# Patient Record
Sex: MALE | Race: WHITE | HISPANIC OR LATINO | Employment: PART TIME | URBAN - METROPOLITAN AREA
[De-identification: names, ages, dates, MRNs, and addresses within clinical notes are randomized per-mention and may not be internally consistent; named-entity substitution may affect disease eponyms.]

---

## 2022-12-18 ENCOUNTER — HOSPITAL ENCOUNTER (EMERGENCY)
Facility: HOSPITAL | Age: 32
Discharge: HOME | End: 2022-12-18
Attending: EMERGENCY MEDICINE
Payer: COMMERCIAL

## 2022-12-18 VITALS
TEMPERATURE: 99 F | WEIGHT: 188.19 LBS | OXYGEN SATURATION: 98 % | RESPIRATION RATE: 18 BRPM | HEART RATE: 84 BPM | DIASTOLIC BLOOD PRESSURE: 77 MMHG | SYSTOLIC BLOOD PRESSURE: 147 MMHG | BODY MASS INDEX: 30.24 KG/M2 | HEIGHT: 66 IN

## 2022-12-18 DIAGNOSIS — J32.9 SINUSITIS, UNSPECIFIED CHRONICITY, UNSPECIFIED LOCATION: Primary | ICD-10-CM

## 2022-12-18 LAB
INFLUENZA A, MOLECULAR: NEGATIVE
INFLUENZA B, MOLECULAR: NEGATIVE
SARS-COV-2 RDRP RESP QL NAA+PROBE: NEGATIVE
SPECIMEN SOURCE: NORMAL

## 2022-12-18 PROCEDURE — U0002 COVID-19 LAB TEST NON-CDC: HCPCS | Performed by: EMERGENCY MEDICINE

## 2022-12-18 PROCEDURE — 96372 THER/PROPH/DIAG INJ SC/IM: CPT | Performed by: NURSE PRACTITIONER

## 2022-12-18 PROCEDURE — 63600175 PHARM REV CODE 636 W HCPCS: Performed by: NURSE PRACTITIONER

## 2022-12-18 PROCEDURE — 99284 EMERGENCY DEPT VISIT MOD MDM: CPT

## 2022-12-18 PROCEDURE — 87502 INFLUENZA DNA AMP PROBE: CPT | Performed by: EMERGENCY MEDICINE

## 2022-12-18 RX ORDER — CETIRIZINE HYDROCHLORIDE 10 MG/1
10 TABLET ORAL DAILY
Qty: 30 TABLET | Refills: 0 | Status: SHIPPED | OUTPATIENT
Start: 2022-12-18 | End: 2023-12-18

## 2022-12-18 RX ORDER — DEXAMETHASONE SODIUM PHOSPHATE 4 MG/ML
8 INJECTION, SOLUTION INTRA-ARTICULAR; INTRALESIONAL; INTRAMUSCULAR; INTRAVENOUS; SOFT TISSUE
Status: COMPLETED | OUTPATIENT
Start: 2022-12-18 | End: 2022-12-18

## 2022-12-18 RX ADMIN — DEXAMETHASONE SODIUM PHOSPHATE 8 MG: 4 INJECTION INTRA-ARTICULAR; INTRALESIONAL; INTRAMUSCULAR; INTRAVENOUS; SOFT TISSUE at 01:12

## 2022-12-18 NOTE — ED PROVIDER NOTES
Encounter Date: 12/18/2022       History     Chief Complaint   Patient presents with    Headache     Congestion, headache, ear pain, pain to face, eyes, denies known sick contacts     Patient complains of facial pressure sinus congestion with nasal discharge that is bloody and purulent at times also reports dizziness and pain around his ears.  States he has this problem waxing and waning for 2 months.  But this episode has been going on for 3 days.  Did not take any medication for this problem.  Denies any exacerbating or mitigating factors.      Review of patient's allergies indicates:  No Known Allergies  No past medical history on file.  No past surgical history on file.  No family history on file.     Review of Systems   Constitutional:  Negative for fever.   HENT:  Negative for sore throat.    Respiratory:  Negative for shortness of breath.    Cardiovascular:  Negative for chest pain.   Gastrointestinal:  Negative for nausea.   Genitourinary:  Negative for dysuria.   Musculoskeletal:  Negative for back pain.   Skin:  Negative for rash.   Neurological:  Negative for weakness.   Hematological:  Does not bruise/bleed easily.     Physical Exam     Initial Vitals [12/18/22 1142]   BP Pulse Resp Temp SpO2   (!) 147/77 84 18 98.8 °F (37.1 °C) 98 %      MAP       --         Physical Exam    Nursing note and vitals reviewed.  Constitutional: He appears well-developed and well-nourished.   HENT:   Head: Normocephalic and atraumatic.   Mild bilateral maxillary and frontal sinus TTP.  Serous effusion in bilateral ears, no bulging.  Posterior pharynx is erythematous, no exudates tonsils +2 bilateral   Eyes: Conjunctivae are normal. Pupils are equal, round, and reactive to light.   Neck: Neck supple.   Normal range of motion.  Cardiovascular:  Normal rate, regular rhythm, normal heart sounds and intact distal pulses.           Pulmonary/Chest: Breath sounds normal.   Abdominal: Abdomen is soft. There is no rebound and no  guarding.   Musculoskeletal:         General: Normal range of motion.      Cervical back: Normal range of motion and neck supple.     Neurological: He is alert.   Skin: Skin is warm and dry.   Psychiatric: He has a normal mood and affect. His behavior is normal. Thought content normal.       ED Course   Procedures  Labs Reviewed   INFLUENZA A & B BY MOLECULAR   SARS-COV-2 RNA AMPLIFICATION, QUAL          Imaging Results    None          Medications   dexAMETHasone injection 8 mg (has no administration in time range)                     I discussed with patient and/or family/caretaker that evaluation in the ED does not suggest any emergent or life threatening medical conditions requiring immediate intervention beyond what was provided in the ED, and I believe patient is safe for discharge.  Regardless, an unremarkable evaluation in the ED does not preclude the development or presence of a serious of life threatening condition. As such, patient was instructed to return immediately for any worsening or change in current symptoms.           Clinical Impression:   Final diagnoses:  [J32.9] Sinusitis, unspecified chronicity, unspecified location (Primary)        ED Disposition Condition    Discharge Stable          ED Prescriptions       Medication Sig Dispense Start Date End Date Auth. Provider    cetirizine (ZYRTEC) 10 MG tablet Take 1 tablet (10 mg total) by mouth once daily. 30 tablet 12/18/2022 12/18/2023 Mac Gore NP          Follow-up Information       Follow up With Specialties Details Why Contact Info    pcp  Schedule an appointment as soon as possible for a visit  As needed              Mac Gore NP  12/18/22 4382

## 2023-02-17 ENCOUNTER — HOSPITAL ENCOUNTER (EMERGENCY)
Facility: HOSPITAL | Age: 33
Discharge: HOME | End: 2023-02-18
Attending: EMERGENCY MEDICINE
Payer: COMMERCIAL

## 2023-02-17 DIAGNOSIS — K62.89 ACUTE PROCTITIS: Primary | ICD-10-CM

## 2023-02-17 PROCEDURE — 99285 EMERGENCY DEPT VISIT HI MDM: CPT | Mod: 25

## 2023-02-18 VITALS
SYSTOLIC BLOOD PRESSURE: 122 MMHG | TEMPERATURE: 99 F | OXYGEN SATURATION: 98 % | HEART RATE: 77 BPM | DIASTOLIC BLOOD PRESSURE: 75 MMHG | HEIGHT: 66 IN | BODY MASS INDEX: 30.37 KG/M2 | RESPIRATION RATE: 20 BRPM

## 2023-02-18 LAB
ALBUMIN SERPL BCP-MCNC: 4 G/DL (ref 3.5–5.2)
ALP SERPL-CCNC: 86 U/L (ref 55–135)
ALT SERPL W/O P-5'-P-CCNC: 50 U/L (ref 10–44)
ANION GAP SERPL CALC-SCNC: 14 MMOL/L (ref 8–16)
AST SERPL-CCNC: 33 U/L (ref 10–40)
BASOPHILS # BLD AUTO: 0.08 K/UL (ref 0–0.2)
BASOPHILS NFR BLD: 0.9 % (ref 0–1.9)
BILIRUB SERPL-MCNC: 0.3 MG/DL (ref 0.1–1)
BILIRUB UR QL STRIP: NEGATIVE
BUN SERPL-MCNC: 11 MG/DL (ref 6–20)
CALCIUM SERPL-MCNC: 9.6 MG/DL (ref 8.7–10.5)
CHLORIDE SERPL-SCNC: 105 MMOL/L (ref 95–110)
CLARITY UR: CLEAR
CO2 SERPL-SCNC: 21 MMOL/L (ref 23–29)
COLOR UR: COLORLESS
CREAT SERPL-MCNC: 0.9 MG/DL (ref 0.5–1.4)
DIFFERENTIAL METHOD: ABNORMAL
EOSINOPHIL # BLD AUTO: 0.2 K/UL (ref 0–0.5)
EOSINOPHIL NFR BLD: 1.8 % (ref 0–8)
ERYTHROCYTE [DISTWIDTH] IN BLOOD BY AUTOMATED COUNT: 12.7 % (ref 11.5–14.5)
EST. GFR  (NO RACE VARIABLE): >60 ML/MIN/1.73 M^2
GLUCOSE SERPL-MCNC: 99 MG/DL (ref 70–110)
GLUCOSE UR QL STRIP: NEGATIVE
HCT VFR BLD AUTO: 39.5 % (ref 40–54)
HCV AB SERPL QL IA: NEGATIVE
HGB BLD-MCNC: 13.5 G/DL (ref 14–18)
HGB UR QL STRIP: NEGATIVE
IMM GRANULOCYTES # BLD AUTO: 0.03 K/UL (ref 0–0.04)
IMM GRANULOCYTES NFR BLD AUTO: 0.3 % (ref 0–0.5)
KETONES UR QL STRIP: NEGATIVE
LEUKOCYTE ESTERASE UR QL STRIP: NEGATIVE
LYMPHOCYTES # BLD AUTO: 2.3 K/UL (ref 1–4.8)
LYMPHOCYTES NFR BLD: 25.6 % (ref 18–48)
MCH RBC QN AUTO: 30.5 PG (ref 27–31)
MCHC RBC AUTO-ENTMCNC: 34.2 G/DL (ref 32–36)
MCV RBC AUTO: 89 FL (ref 82–98)
MONOCYTES # BLD AUTO: 0.8 K/UL (ref 0.3–1)
MONOCYTES NFR BLD: 9 % (ref 4–15)
NEUTROPHILS # BLD AUTO: 5.7 K/UL (ref 1.8–7.7)
NEUTROPHILS NFR BLD: 62.4 % (ref 38–73)
NITRITE UR QL STRIP: NEGATIVE
NRBC BLD-RTO: 0 /100 WBC
PH UR STRIP: 6 [PH] (ref 5–8)
PLATELET # BLD AUTO: 319 K/UL (ref 150–450)
PMV BLD AUTO: 9.5 FL (ref 9.2–12.9)
POTASSIUM SERPL-SCNC: 3.3 MMOL/L (ref 3.5–5.1)
PROT SERPL-MCNC: 8.2 G/DL (ref 6–8.4)
PROT UR QL STRIP: NEGATIVE
RBC # BLD AUTO: 4.43 M/UL (ref 4.6–6.2)
SODIUM SERPL-SCNC: 140 MMOL/L (ref 136–145)
SP GR UR STRIP: 1 (ref 1–1.03)
URN SPEC COLLECT METH UR: ABNORMAL
UROBILINOGEN UR STRIP-ACNC: NEGATIVE EU/DL
WBC # BLD AUTO: 9.11 K/UL (ref 3.9–12.7)

## 2023-02-18 PROCEDURE — 63600175 PHARM REV CODE 636 W HCPCS: Performed by: EMERGENCY MEDICINE

## 2023-02-18 PROCEDURE — 85025 COMPLETE CBC W/AUTO DIFF WBC: CPT | Performed by: EMERGENCY MEDICINE

## 2023-02-18 PROCEDURE — 25000003 PHARM REV CODE 250: Performed by: EMERGENCY MEDICINE

## 2023-02-18 PROCEDURE — 86803 HEPATITIS C AB TEST: CPT | Performed by: EMERGENCY MEDICINE

## 2023-02-18 PROCEDURE — 80053 COMPREHEN METABOLIC PANEL: CPT | Performed by: EMERGENCY MEDICINE

## 2023-02-18 PROCEDURE — 81003 URINALYSIS AUTO W/O SCOPE: CPT | Performed by: EMERGENCY MEDICINE

## 2023-02-18 PROCEDURE — 96365 THER/PROPH/DIAG IV INF INIT: CPT

## 2023-02-18 PROCEDURE — 25500020 PHARM REV CODE 255: Performed by: EMERGENCY MEDICINE

## 2023-02-18 RX ORDER — DOXYCYCLINE 100 MG/1
100 CAPSULE ORAL 2 TIMES DAILY
Qty: 42 CAPSULE | Refills: 0 | Status: SHIPPED | OUTPATIENT
Start: 2023-02-18 | End: 2023-03-11

## 2023-02-18 RX ADMIN — CEFTRIAXONE 1 G: 1 INJECTION, POWDER, FOR SOLUTION INTRAMUSCULAR; INTRAVENOUS at 04:02

## 2023-02-18 RX ADMIN — IOHEXOL 100 ML: 350 INJECTION, SOLUTION INTRAVENOUS at 02:02

## 2023-02-18 NOTE — ED PROVIDER NOTES
SCRIBE #1 NOTE: I, Justin Rice, am scribing for, and in the presence of, Nikole Curry MD. I have scribed the entire note.       History     Chief Complaint   Patient presents with    Rectal Bleeding     X 2 weeks after having sexual relations, decreased appetite, headache, rectal pain     Review of patient's allergies indicates:  No Known Allergies      History of Present Illness     HPI    2/18/2023, 12:20 AM  History obtained from the patient      History of Present Illness: Ramon Marcial is a 32 y.o. male patient who presents to the Emergency Department for evaluation of rectal bleeding which onset gradually 2 weeks ago. Pt states he had sexual intercourse when he had rectal pain and bleeding. He notes he has fecal urgency but only excretes odorous blood clots and white discharge. He also c/o dysuria and urinary urgency.  Symptoms are constant and moderate in severity. No mitigating or exacerbating factors reported. Associated sxs include abdominal pain and fever. Patient denies any HA, CP, SOB, hematuria, flank pain, and all other sxs at this time. No further complaints or concerns at this time.       Arrival mode: Personal vehicle    PCP: Primary Doctor No        Past Medical History:  History reviewed. No pertinent past medical history.    Past Surgical History:  History reviewed. No pertinent surgical history.      Family History:  History reviewed. No pertinent family history.    Social History:  Social History     Tobacco Use    Smoking status: Unknown    Smokeless tobacco: Not on file   Substance and Sexual Activity    Alcohol use: Not on file    Drug use: Not on file    Sexual activity: Not on file        Review of Systems     Review of Systems   Constitutional:  Positive for fever (subjective).   HENT:  Negative for sore throat.    Respiratory:  Negative for shortness of breath.    Cardiovascular:  Negative for chest pain.   Gastrointestinal:  Positive for abdominal pain and anal  bleeding. Negative for constipation, diarrhea, nausea and vomiting.   Genitourinary:  Positive for dysuria and urgency. Negative for flank pain.   Musculoskeletal:  Negative for back pain.   Skin:  Negative for rash.   Neurological:  Negative for dizziness, weakness, numbness and headaches.   Hematological:  Does not bruise/bleed easily.   All other systems reviewed and are negative.     Physical Exam     Initial Vitals [02/17/23 2328]   BP Pulse Resp Temp SpO2   (!) 144/86 97 18 99.9 °F (37.7 °C) 99 %      MAP       --          Physical Exam   Nursing Notes and Vital Signs Reviewed.  Constitutional: Patient is in no acute distress. Well-developed and well-nourished.  Head: Atraumatic. Normocephalic.  Eyes: PERRL. EOM intact. Conjunctivae are not pale. No scleral icterus.  ENT: Mucous membranes are moist. Oropharynx is clear and symmetric.    Neck: Supple. Full ROM. No lymphadenopathy.  Cardiovascular: Regular rate. Regular rhythm. No murmurs, rubs, or gallops. Distal pulses are 2+ and symmetric.  Pulmonary/Chest: No respiratory distress. Clear to auscultation bilaterally. No wheezing or rales.  Abdominal: Soft and non-distended.  There is no tenderness.  No rebound, guarding, or rigidity. Good bowel sounds.  Genitourinary: No CVA tenderness  Rectal: Male chaperone present for the duration of the rectal exam.There is no tenderness. There is blood in the rectal vault. Normal sphincter tone. No mass or ulceration. No external lesions   Musculoskeletal: Moves all extremities. No obvious deformities. No edema. No calf tenderness.  Skin: Warm and dry.  Neurological:  Alert, awake, and appropriate.  Normal speech.  No acute focal neurological deficits are appreciated.  Psychiatric: Normal affect. Good eye contact. Appropriate in content.     ED Course   Procedures  ED Vital Signs:  Vitals:    02/17/23 2328 02/18/23 0235 02/18/23 0237 02/18/23 0500   BP: (!) 144/86 138/79  122/75   Pulse: 97 76  77   Resp: 18 20     Temp:  "99.9 °F (37.7 °C)  98.6 °F (37 °C) 98.7 °F (37.1 °C)   TempSrc: Oral  Oral    SpO2: 99% 98%  98%   Height: 5' 6" (1.676 m)          Abnormal Lab Results:  Labs Reviewed   CBC W/ AUTO DIFFERENTIAL - Abnormal; Notable for the following components:       Result Value    RBC 4.43 (*)     Hemoglobin 13.5 (*)     Hematocrit 39.5 (*)     All other components within normal limits   COMPREHENSIVE METABOLIC PANEL - Abnormal; Notable for the following components:    Potassium 3.3 (*)     CO2 21 (*)     ALT 50 (*)     All other components within normal limits   URINALYSIS, REFLEX TO URINE CULTURE - Abnormal; Notable for the following components:    Color, UA Colorless (*)     All other components within normal limits    Narrative:     Specimen Source->Urine   HEPATITIS C ANTIBODY   HEP C VIRUS HOLD SPECIMEN        All Lab Results:  Results for orders placed or performed during the hospital encounter of 02/17/23   CBC auto differential   Result Value Ref Range    WBC 9.11 3.90 - 12.70 K/uL    RBC 4.43 (L) 4.60 - 6.20 M/uL    Hemoglobin 13.5 (L) 14.0 - 18.0 g/dL    Hematocrit 39.5 (L) 40.0 - 54.0 %    MCV 89 82 - 98 fL    MCH 30.5 27.0 - 31.0 pg    MCHC 34.2 32.0 - 36.0 g/dL    RDW 12.7 11.5 - 14.5 %    Platelets 319 150 - 450 K/uL    MPV 9.5 9.2 - 12.9 fL    Immature Granulocytes 0.3 0.0 - 0.5 %    Gran # (ANC) 5.7 1.8 - 7.7 K/uL    Immature Grans (Abs) 0.03 0.00 - 0.04 K/uL    Lymph # 2.3 1.0 - 4.8 K/uL    Mono # 0.8 0.3 - 1.0 K/uL    Eos # 0.2 0.0 - 0.5 K/uL    Baso # 0.08 0.00 - 0.20 K/uL    nRBC 0 0 /100 WBC    Gran % 62.4 38.0 - 73.0 %    Lymph % 25.6 18.0 - 48.0 %    Mono % 9.0 4.0 - 15.0 %    Eosinophil % 1.8 0.0 - 8.0 %    Basophil % 0.9 0.0 - 1.9 %    Differential Method Automated    Comprehensive metabolic panel   Result Value Ref Range    Sodium 140 136 - 145 mmol/L    Potassium 3.3 (L) 3.5 - 5.1 mmol/L    Chloride 105 95 - 110 mmol/L    CO2 21 (L) 23 - 29 mmol/L    Glucose 99 70 - 110 mg/dL    BUN 11 6 - 20 mg/dL    " Creatinine 0.9 0.5 - 1.4 mg/dL    Calcium 9.6 8.7 - 10.5 mg/dL    Total Protein 8.2 6.0 - 8.4 g/dL    Albumin 4.0 3.5 - 5.2 g/dL    Total Bilirubin 0.3 0.1 - 1.0 mg/dL    Alkaline Phosphatase 86 55 - 135 U/L    AST 33 10 - 40 U/L    ALT 50 (H) 10 - 44 U/L    Anion Gap 14 8 - 16 mmol/L    eGFR >60 >60 mL/min/1.73 m^2   Urinalysis, Reflex to Urine Culture Urine, Clean Catch    Specimen: Urine   Result Value Ref Range    Specimen UA Urine, Clean Catch     Color, UA Colorless (A) Yellow, Straw, Saadia    Appearance, UA Clear Clear    pH, UA 6.0 5.0 - 8.0    Specific Gravity, UA 1.005 1.005 - 1.030    Protein, UA Negative Negative    Glucose, UA Negative Negative    Ketones, UA Negative Negative    Bilirubin (UA) Negative Negative    Occult Blood UA Negative Negative    Nitrite, UA Negative Negative    Urobilinogen, UA Negative <2.0 EU/dL    Leukocytes, UA Negative Negative          Imaging Results:  Imaging Results              CT Abdomen Pelvis With Contrast (In process)                    3:55 AM: Per Emmanuel Mcgregor MD from [facility], pt's CT abdomen and pelvis w/ IV Contrast results: Diffuse rectal wall thickening with adjacent stranding. A sister with this are multiple prominent perirectal lymph node. Findings likely related to proctitis. Neoplasm is not excluded. No evidence of bowel obstruction. No evidence of pneumatosis, portal venous gas, or free air. Colonic diverticulosis. No evidence of diverticulitis. Normal appendix. No other acute findings.       The Emergency Provider reviewed the vital signs and test results, which are outlined above.     ED Discussion       4:00 AM: Reassessed pt at this time. Discussed with pt all pertinent ED information and results. Discussed pt dx and plan of tx. Gave pt all f/u and return to the ED instructions. All questions and concerns were addressed at this time. Pt expresses understanding of information and instructions, and is comfortable with plan to discharge. Pt is  stable for discharge.    I discussed with patient and/or family/caretaker that evaluation in the ED does not suggest any emergent or life threatening medical conditions requiring immediate intervention beyond what was provided in the ED, and I believe patient is safe for discharge.  Regardless, an unremarkable evaluation in the ED does not preclude the development or presence of a serious of life threatening condition. As such, patient was instructed to return immediately for any worsening or change in current symptoms.         Medical Decision Making:   Clinical Tests:   Lab Tests: Ordered and Reviewed         ED Medication(s):  Medications   iohexoL (OMNIPAQUE 350) injection 100 mL (100 mLs Intravenous Given 2/18/23 0221)   cefTRIAXone (ROCEPHIN) 1 g in dextrose 5 % in water (D5W) 5 % 50 mL IVPB (MB+) (0 g Intravenous Stopped 2/18/23 0459)       New Prescriptions    DOXYCYCLINE (VIBRAMYCIN) 100 MG CAP    Take 1 capsule (100 mg total) by mouth 2 (two) times daily. for 21 days        Follow-up Information       Emmanuel Baker MD In 2 days.    Specialties: Colon and Rectal Surgery, General Surgery  Contact information:  [address] [place] [address] DR Madiha MENDOZA [id number] [phone]                                 Scribe Attestation:   Scribe #1: I performed the above scribed service and the documentation accurately describes the services I performed. I attest to the accuracy of the note.     Attending:   Physician Attestation Statement for Scribe #1: I, Nikole Curry MD, personally performed the services described in this documentation, as scribed by Justin Rice, in my presence, and it is both accurate and complete.           Clinical Impression       ICD-10-CM ICD-9-CM   1. Acute proctitis  K62.89 569.49       Disposition:   Disposition: Discharged  Condition: Stable       Nikole Curry MD  02/18/23 1499

## 2023-02-20 ENCOUNTER — OFFICE VISIT (OUTPATIENT)
Dept: SURGERY | Facility: CLINIC | Age: 33
End: 2023-02-20
Payer: COMMERCIAL

## 2023-02-20 ENCOUNTER — OFFICE VISIT (OUTPATIENT)
Dept: INFECTIOUS DISEASES | Facility: CLINIC | Age: 33
End: 2023-02-20
Payer: COMMERCIAL

## 2023-02-20 VITALS
DIASTOLIC BLOOD PRESSURE: 81 MMHG | HEART RATE: 75 BPM | BODY MASS INDEX: 30.35 KG/M2 | WEIGHT: 188.06 LBS | SYSTOLIC BLOOD PRESSURE: 128 MMHG

## 2023-02-20 VITALS
HEIGHT: 66 IN | BODY MASS INDEX: 30.22 KG/M2 | HEART RATE: 75 BPM | SYSTOLIC BLOOD PRESSURE: 128 MMHG | DIASTOLIC BLOOD PRESSURE: 81 MMHG | WEIGHT: 188.06 LBS

## 2023-02-20 DIAGNOSIS — R30.0 DYSURIA: ICD-10-CM

## 2023-02-20 DIAGNOSIS — K62.89 PROCTITIS: ICD-10-CM

## 2023-02-20 DIAGNOSIS — R15.2 FECAL URGENCY: ICD-10-CM

## 2023-02-20 DIAGNOSIS — A64 SEXUALLY TRANSMITTED DISEASE: ICD-10-CM

## 2023-02-20 DIAGNOSIS — A56.3: ICD-10-CM

## 2023-02-20 DIAGNOSIS — B20 HIV INFECTION, UNSPECIFIED SYMPTOM STATUS: ICD-10-CM

## 2023-02-20 DIAGNOSIS — A64 SEXUALLY TRANSMITTED DISEASE: Primary | ICD-10-CM

## 2023-02-20 DIAGNOSIS — K62.5 RECTAL BLEEDING: Primary | ICD-10-CM

## 2023-02-20 DIAGNOSIS — K92.1 HEMATOCHEZIA: ICD-10-CM

## 2023-02-20 PROBLEM — Z21 HIV (HUMAN IMMUNODEFICIENCY VIRUS INFECTION): Status: ACTIVE | Noted: 2021-09-25

## 2023-02-20 PROCEDURE — 1159F PR MEDICATION LIST DOCUMENTED IN MEDICAL RECORD: ICD-10-PCS | Mod: CPTII,S$GLB,, | Performed by: INTERNAL MEDICINE

## 2023-02-20 PROCEDURE — 3074F PR MOST RECENT SYSTOLIC BLOOD PRESSURE < 130 MM HG: ICD-10-PCS | Mod: CPTII,S$GLB,, | Performed by: COLON & RECTAL SURGERY

## 2023-02-20 PROCEDURE — 99204 OFFICE O/P NEW MOD 45 MIN: CPT | Mod: S$GLB,,, | Performed by: COLON & RECTAL SURGERY

## 2023-02-20 PROCEDURE — 3074F PR MOST RECENT SYSTOLIC BLOOD PRESSURE < 130 MM HG: ICD-10-PCS | Mod: CPTII,S$GLB,, | Performed by: INTERNAL MEDICINE

## 2023-02-20 PROCEDURE — 99999 PR PBB SHADOW E&M-EST. PATIENT-LVL III: ICD-10-PCS | Mod: PBBFAC,,, | Performed by: INTERNAL MEDICINE

## 2023-02-20 PROCEDURE — 99204 PR OFFICE/OUTPT VISIT, NEW, LEVL IV, 45-59 MIN: ICD-10-PCS | Mod: S$GLB,,, | Performed by: COLON & RECTAL SURGERY

## 2023-02-20 PROCEDURE — 3008F BODY MASS INDEX DOCD: CPT | Mod: CPTII,S$GLB,, | Performed by: INTERNAL MEDICINE

## 2023-02-20 PROCEDURE — 3079F PR MOST RECENT DIASTOLIC BLOOD PRESSURE 80-89 MM HG: ICD-10-PCS | Mod: CPTII,S$GLB,, | Performed by: INTERNAL MEDICINE

## 2023-02-20 PROCEDURE — 3008F PR BODY MASS INDEX (BMI) DOCUMENTED: ICD-10-PCS | Mod: CPTII,S$GLB,, | Performed by: INTERNAL MEDICINE

## 2023-02-20 PROCEDURE — 3079F PR MOST RECENT DIASTOLIC BLOOD PRESSURE 80-89 MM HG: ICD-10-PCS | Mod: CPTII,S$GLB,, | Performed by: COLON & RECTAL SURGERY

## 2023-02-20 PROCEDURE — 3074F SYST BP LT 130 MM HG: CPT | Mod: CPTII,S$GLB,, | Performed by: COLON & RECTAL SURGERY

## 2023-02-20 PROCEDURE — 99999 PR PBB SHADOW E&M-EST. PATIENT-LVL III: CPT | Mod: PBBFAC,,, | Performed by: INTERNAL MEDICINE

## 2023-02-20 PROCEDURE — 3079F DIAST BP 80-89 MM HG: CPT | Mod: CPTII,S$GLB,, | Performed by: COLON & RECTAL SURGERY

## 2023-02-20 PROCEDURE — 99999 PR PBB SHADOW E&M-EST. PATIENT-LVL III: ICD-10-PCS | Mod: PBBFAC,,, | Performed by: COLON & RECTAL SURGERY

## 2023-02-20 PROCEDURE — 3008F BODY MASS INDEX DOCD: CPT | Mod: CPTII,S$GLB,, | Performed by: COLON & RECTAL SURGERY

## 2023-02-20 PROCEDURE — 3079F DIAST BP 80-89 MM HG: CPT | Mod: CPTII,S$GLB,, | Performed by: INTERNAL MEDICINE

## 2023-02-20 PROCEDURE — 1159F MED LIST DOCD IN RCRD: CPT | Mod: CPTII,S$GLB,, | Performed by: INTERNAL MEDICINE

## 2023-02-20 PROCEDURE — 3074F SYST BP LT 130 MM HG: CPT | Mod: CPTII,S$GLB,, | Performed by: INTERNAL MEDICINE

## 2023-02-20 PROCEDURE — 3008F PR BODY MASS INDEX (BMI) DOCUMENTED: ICD-10-PCS | Mod: CPTII,S$GLB,, | Performed by: COLON & RECTAL SURGERY

## 2023-02-20 PROCEDURE — 99999 PR PBB SHADOW E&M-EST. PATIENT-LVL III: CPT | Mod: PBBFAC,,, | Performed by: COLON & RECTAL SURGERY

## 2023-02-20 PROCEDURE — 99204 PR OFFICE/OUTPT VISIT, NEW, LEVL IV, 45-59 MIN: ICD-10-PCS | Mod: S$GLB,,, | Performed by: INTERNAL MEDICINE

## 2023-02-20 PROCEDURE — 87491 CHLMYD TRACH DNA AMP PROBE: CPT | Performed by: INTERNAL MEDICINE

## 2023-02-20 PROCEDURE — 99204 OFFICE O/P NEW MOD 45 MIN: CPT | Mod: S$GLB,,, | Performed by: INTERNAL MEDICINE

## 2023-02-20 RX ORDER — TRAZODONE HYDROCHLORIDE 50 MG/1
50 TABLET ORAL NIGHTLY
COMMUNITY
Start: 2023-01-30

## 2023-02-20 RX ORDER — BICTEGRAVIR SODIUM, EMTRICITABINE, AND TENOFOVIR ALAFENAMIDE FUMARATE 50; 200; 25 MG/1; MG/1; MG/1
1 TABLET ORAL
COMMUNITY
Start: 2023-01-30

## 2023-02-20 RX ORDER — ONDANSETRON 4 MG/1
4 TABLET, FILM COATED ORAL EVERY 12 HOURS PRN
Qty: 40 TABLET | Refills: 0 | Status: SHIPPED | OUTPATIENT
Start: 2023-02-20 | End: 2023-03-12

## 2023-02-20 RX ORDER — ESCITALOPRAM OXALATE 10 MG/1
10 TABLET ORAL
COMMUNITY
Start: 2023-01-30

## 2023-02-20 RX ORDER — ERGOCALCIFEROL 1.25 MG/1
50000 CAPSULE ORAL
COMMUNITY
Start: 2023-01-30

## 2023-02-20 NOTE — PROGRESS NOTES
Subjective:       Patient ID: Ramon Marcial is a 32 y.o. male.    Chief Complaint: STD CHECK     HPI 32 year old man with HIV -on Biktarvy   HIV was acquired 2 years ago -  Risk factor -MSM  He was referred by Dr Baker for rectal bleeding post male insertive intercourse   He was seen in the ED- 02/18 for this and was started on doxycycline .  He reports nausea with this medication.  CT scan of the abdomen -   Rectal wall thickening with inflammatory changes in the perirectal fat.  Findings could be related to proctitis or diverticulitis versus neoplastic process.  Colonic diverticulosis      PMH-      past surgical history -ORIF of right ankle and left ankle    family history -.DM,  Social History     Socioeconomic History    Marital status: Single   Tobacco Use    Smoking status: Some Days     Types: Cigarettes     Review of Systems   Constitutional:  Positive for activity change. Negative for appetite change, chills, diaphoresis and fatigue.   HENT:  Negative for congestion, dental problem, ear discharge, ear pain and facial swelling.    Eyes:  Negative for pain, discharge and itching.   Respiratory:  Negative for apnea, cough, chest tightness and shortness of breath.    Cardiovascular:  Negative for chest pain and leg swelling.   Gastrointestinal:  Positive for anal bleeding. Negative for abdominal distention and abdominal pain.   Endocrine: Negative for cold intolerance, heat intolerance and polydipsia.   Genitourinary:  Negative for difficulty urinating, dysuria and enuresis.   Musculoskeletal:  Negative for arthralgias and back pain.   Skin:  Negative for color change and pallor.   Allergic/Immunologic: Negative for environmental allergies and food allergies.   Neurological:  Negative for dizziness, facial asymmetry, light-headedness and headaches.   Hematological:  Negative for adenopathy. Does not bruise/bleed easily.   Psychiatric/Behavioral:  Negative for agitation and behavioral  problems.      Objective:      Physical Exam  Vitals and nursing note reviewed.   Constitutional:       Appearance: He is well-developed.   HENT:      Head: Normocephalic and atraumatic.      Nose: Nose normal.   Eyes:      Pupils: Pupils are equal, round, and reactive to light.   Cardiovascular:      Rate and Rhythm: Normal rate and regular rhythm.      Heart sounds: Normal heart sounds.   Pulmonary:      Effort: Pulmonary effort is normal. No respiratory distress.      Breath sounds: Normal breath sounds. No wheezing or rales.   Abdominal:      General: Abdomen is flat. There is no distension.      Tenderness: There is no abdominal tenderness.   Genitourinary:     Comments: Rectal swab done for GC  No obvious external lesion noted   Musculoskeletal:         General: Normal range of motion.      Cervical back: Normal range of motion and neck supple.   Skin:     General: Skin is dry.   Neurological:      Mental Status: He is alert and oriented to person, place, and time.   Psychiatric:         Mood and Affect: Mood normal.       Assessment:       1. Rectal bleeding  C. Trachomatis/N. Gonorrhoeae by AMP DNA (Rectal)    C. Trachomatis/N. Gonorrhoeae by AMP DNA (Rectal)      2. Proctitis, chlamydial        3. HIV infection, unspecified symptom status            Plan:             Problem List Items Addressed This Visit       Proctitis, chlamydial     This is suspected -LGV proctitis   Will do Rectal GC swab   Will complete 21 days of doxycycline ,will add zofran prn for nausea   Was given a dose Rocephin in the ED -02/18/23          HIV (human immunodeficiency virus infection)     He is a patient of  [facility]  at [facility]-will continue Biktarvy             Other Visit Diagnoses       Rectal bleeding    -  Primary    Relevant Orders    C. Trachomatis/N. Gonorrhoeae by AMP DNA (Rectal)

## 2023-02-20 NOTE — ASSESSMENT & PLAN NOTE
This is suspected -  Will do Rectal GC swab   Will complete 21 days of doxycycline ,will add zofran prn for nausea   Was given a dose Rocephin in the ED -02/18/23

## 2023-02-20 NOTE — PROGRESS NOTES
History & Physical    SUBJECTIVE:     Chief Complaint   Patient presents with    Rectal Bleeding   Ref: ED     History of Present Illness:  Patient is a 32 y.o. male presents for evaluation of rectal bleeding, rectal pain and stomach pain. Started around 2 weeks ago after having anal receptive sexual relations with a new male partner. Having some blood per rectum, hematochezia, anorectal pain, fever, chills and white discharge. Blood clots per rectum as well. Never had anything like this before. Having some perineal pain as well.  Seen in the ED on 02/18/2023 and started on doxycycline for prostatitis.  Reports nausea with this.  Did undergo CT scan showing inflammatory changes of the rectum consistent with proctitis.  Sent to [facility] for evaluation.  Endorses excreting blood clots and white discharge between bowel movements.  Has some fecal urgency as well as urinary urgency.    Review of patient's allergies indicates:  No Known Allergies    Current Outpatient Medications   Medication Sig Dispense Refill    BIKTARVY -25 mg (25 kg or greater) Take 1 tablet by mouth.      cetirizine (ZYRTEC) 10 MG tablet Take 1 tablet (10 mg total) by mouth once daily. 30 tablet 0    doxycycline (VIBRAMYCIN) 100 MG Cap Take 1 capsule (100 mg total) by mouth 2 (two) times daily. for 21 days 42 capsule 0    EScitalopram oxalate (LEXAPRO) 10 MG tablet Take 10 mg by mouth.      traZODone (DESYREL) 50 MG tablet Take 50 mg by mouth every evening.      VITAMIN D2 1,250 mcg (50,000 unit) capsule Take 50,000 Units by mouth every 7 days.       No current facility-administered medications for this visit.       No past medical history on file.  No past surgical history on file.  No family history on file.  Social History     Tobacco Use    Smoking status: Some Days     Types: Cigarettes        Review of Systems:  Review of Systems   Constitutional:  Negative for activity change, appetite change, chills, fatigue, fever and unexpected weight  change.   HENT:  Negative for congestion, ear pain, sore throat and trouble swallowing.    Eyes:  Negative for pain, redness and itching.   Respiratory:  Negative for cough, shortness of breath and wheezing.    Cardiovascular:  Negative for chest pain, palpitations and leg swelling.   Gastrointestinal:  Positive for anal bleeding, blood in stool and rectal pain. Negative for abdominal distention, abdominal pain, diarrhea and vomiting.   Endocrine: Negative for cold intolerance, heat intolerance and polyuria.   Genitourinary:  Negative for dysuria, flank pain, frequency and hematuria.   Musculoskeletal:  Negative for gait problem, joint swelling and neck pain.   Skin:  Negative for color change, rash and wound.   Allergic/Immunologic: Negative for environmental allergies and immunocompromised state.   Neurological:  Negative for dizziness, speech difficulty, weakness and numbness.   Psychiatric/Behavioral:  Negative for agitation, confusion and hallucinations.      OBJECTIVE:     Vital Signs (Most Recent)  Pulse: 75 (02/20/23 0844)  BP: 128/81 (02/20/23 0844)     85.3 kg (188 lb 0.8 oz)     Physical Exam:  Physical Exam  Constitutional:       Appearance: He is well-developed.   HENT:      Head: Normocephalic and atraumatic.   Eyes:      Conjunctiva/sclera: Conjunctivae normal.   Neck:      Thyroid: No thyromegaly.   Cardiovascular:      Rate and Rhythm: Normal rate and regular rhythm.   Pulmonary:      Effort: Pulmonary effort is normal. No respiratory distress.   Abdominal:      General: There is no distension.      Palpations: Abdomen is soft. There is no mass.      Tenderness: There is no abdominal tenderness.   Genitourinary:     Comments: Anorectal: no external lesions; +TTP circumferential perianal and perineal; no blood or purulence expressible; ZEHRA with good tone, no blood, no palpable masses or defects; +TTP upon insertion and palpation circumferential  Musculoskeletal:         General: No tenderness. Normal  range of motion.      Cervical back: Normal range of motion.   Skin:     General: Skin is warm and dry.      Capillary Refill: Capillary refill takes less than 2 seconds.      Findings: No rash.   Neurological:      Mental Status: He is alert and oriented to person, place, and time.     Laboratory  Lab Results   Component Value Date    WBC 9.11 02/18/2023    HGB 13.5 (L) 02/18/2023    HCT 39.5 (L) 02/18/2023     02/18/2023    ALT 50 (H) 02/18/2023    AST 33 02/18/2023     02/18/2023    K 3.3 (L) 02/18/2023     02/18/2023    CREATININE 0.9 02/18/2023    BUN 11 02/18/2023    CO2 21 (L) 02/18/2023       Diagnostic Results:  CT: Reviewed    CT Feb 2023:  FINDINGS:  Lung bases are clear.     The liveris normal. The spleen appears normal. The gallbladder is normal.     The pancreas is unremarkable.     Kidneys are unremarkable. The adrenal glands are normal.     No free fluid or inflammatory change.     There is pronounced rectal wall thickening with extensive surrounding inflammatory changes versus neoplastic infiltration.     The urinary bladder is unremarkable.     No significant osseous abnormality is identified.     Impression:     Rectal wall thickening with inflammatory changes in the perirectal fat.  Findings could be related to proctitis or diverticulitis versus neoplastic process.  Colonic diverticulosis.    ASSESSMENT/PLAN:     33yo M with proctitis, hematochezia, anorectal bleeding     - Long discussion with the patient that the symptoms were centered around a new sexual partner there was concern for possible anorectal STD.  Discussed that I would recommend anorectal STD swabbing and treatment for this.  We will refer him to [facility] for this.  - if the bleeding or proctitis continues despite this treatment, he may warrant endoscopic evaluation.   - RTC PRN    Emmanuel Baker MD  Colon and Rectal Surgery  [facility]

## 2023-02-25 LAB
C TRACH RRNA SPEC QL NAA+PROBE: POSITIVE
N GONORRHOEA RRNA SPEC QL NAA+PROBE: NEGATIVE
N.GONORROHEAE, AMP RNA SOURCE: ABNORMAL
SPECIMEN SOURCE: ABNORMAL

## 2023-03-02 ENCOUNTER — TELEPHONE (OUTPATIENT)
Dept: INFECTIOUS DISEASES | Facility: CLINIC | Age: 33
End: 2023-03-02
Payer: COMMERCIAL

## 2023-03-02 NOTE — TELEPHONE ENCOUNTER
----- Message from Víctor Burns MD, FIDSA sent at 3/1/2023  6:20 PM CST -----  Please complete 21 days of doxycycline as test for Chlamydia is positive

## 2023-03-22 ENCOUNTER — OFFICE VISIT (OUTPATIENT)
Dept: INFECTIOUS DISEASES | Facility: CLINIC | Age: 33
End: 2023-03-22
Payer: COMMERCIAL

## 2023-03-22 VITALS
DIASTOLIC BLOOD PRESSURE: 80 MMHG | SYSTOLIC BLOOD PRESSURE: 119 MMHG | HEIGHT: 66 IN | HEART RATE: 59 BPM | WEIGHT: 188.06 LBS | BODY MASS INDEX: 30.22 KG/M2

## 2023-03-22 DIAGNOSIS — A56.3: ICD-10-CM

## 2023-03-22 DIAGNOSIS — B20 HIV INFECTION, UNSPECIFIED SYMPTOM STATUS: ICD-10-CM

## 2023-03-22 PROCEDURE — 1159F PR MEDICATION LIST DOCUMENTED IN MEDICAL RECORD: ICD-10-PCS | Mod: CPTII,S$GLB,, | Performed by: INTERNAL MEDICINE

## 2023-03-22 PROCEDURE — 1159F MED LIST DOCD IN RCRD: CPT | Mod: CPTII,S$GLB,, | Performed by: INTERNAL MEDICINE

## 2023-03-22 PROCEDURE — 3074F SYST BP LT 130 MM HG: CPT | Mod: CPTII,S$GLB,, | Performed by: INTERNAL MEDICINE

## 2023-03-22 PROCEDURE — 3079F PR MOST RECENT DIASTOLIC BLOOD PRESSURE 80-89 MM HG: ICD-10-PCS | Mod: CPTII,S$GLB,, | Performed by: INTERNAL MEDICINE

## 2023-03-22 PROCEDURE — 99999 PR PBB SHADOW E&M-EST. PATIENT-LVL III: CPT | Mod: PBBFAC,,, | Performed by: INTERNAL MEDICINE

## 2023-03-22 PROCEDURE — 99999 PR PBB SHADOW E&M-EST. PATIENT-LVL III: ICD-10-PCS | Mod: PBBFAC,,, | Performed by: INTERNAL MEDICINE

## 2023-03-22 PROCEDURE — 3074F PR MOST RECENT SYSTOLIC BLOOD PRESSURE < 130 MM HG: ICD-10-PCS | Mod: CPTII,S$GLB,, | Performed by: INTERNAL MEDICINE

## 2023-03-22 PROCEDURE — 99214 OFFICE O/P EST MOD 30 MIN: CPT | Mod: S$GLB,,, | Performed by: INTERNAL MEDICINE

## 2023-03-22 PROCEDURE — 99214 PR OFFICE/OUTPT VISIT, EST, LEVL IV, 30-39 MIN: ICD-10-PCS | Mod: S$GLB,,, | Performed by: INTERNAL MEDICINE

## 2023-03-22 PROCEDURE — 3008F BODY MASS INDEX DOCD: CPT | Mod: CPTII,S$GLB,, | Performed by: INTERNAL MEDICINE

## 2023-03-22 PROCEDURE — 3008F PR BODY MASS INDEX (BMI) DOCUMENTED: ICD-10-PCS | Mod: CPTII,S$GLB,, | Performed by: INTERNAL MEDICINE

## 2023-03-22 PROCEDURE — 3079F DIAST BP 80-89 MM HG: CPT | Mod: CPTII,S$GLB,, | Performed by: INTERNAL MEDICINE

## 2023-03-22 NOTE — PROGRESS NOTES
Subjective:       Patient ID: Ramon Marcial is a 32 y.o. male.    Chief Complaint: Follow-up    HPI  Last ID note-  02/20-   32 year old man with HIV -on Biktarvy   HIV was acquired 2 years ago -  Risk factor -MSM  He was referred by Dr Baker for rectal bleeding post male insertive intercourse   He was seen in the ED- 02/18 for this and was started on doxycycline .  He reports nausea with this medication.  CT scan of the abdomen -   Rectal wall thickening with inflammatory changes in the perirectal fat.  Findings could be related to proctitis or diverticulitis versus neoplastic process.  Colonic diverticulosis    03/22- during last visit- he has completed 3 weeks of doxycycline       Component Ref Range & Units 1 mo ago   C. trach. RNA Source  rectal VC    C.trach. Misc. Amp RNA Negative Positive Abnormal     Comment: -------------------ADDITIONAL INFORMATION-------------------   This report is intended for use in clinical monitoring          Review of Systems   Constitutional:  Negative for activity change, appetite change, chills and diaphoresis.   HENT:  Negative for dental problem and drooling.    Eyes:  Negative for pain, discharge and itching.   Respiratory:  Negative for apnea and chest tightness.    Neurological:  Negative for coordination difficulties and coordination difficulties.   Hematological:  Negative for adenopathy. Does not bruise/bleed easily.       Objective:      Physical Exam  Vitals and nursing note reviewed.   HENT:      Head: Normocephalic and atraumatic.      Nose: Nose normal.   Eyes:      Pupils: Pupils are equal, round, and reactive to light.   Neck:      Thyroid: No thyromegaly.   Cardiovascular:      Rate and Rhythm: Normal rate and regular rhythm.   Pulmonary:      Effort: Pulmonary effort is normal. No respiratory distress.      Breath sounds: No wheezing.   Abdominal:      General: Bowel sounds are normal.      Palpations: Abdomen is soft.      Tenderness: There is  no abdominal tenderness.   Musculoskeletal:         General: Normal range of motion.      Cervical back: Normal range of motion and neck supple.   Skin:     General: Skin is warm.   Neurological:      Mental Status: He is oriented to person, place, and time.       Assessment:       1. HIV infection, unspecified symptom status        2. Proctitis, chlamydial              Plan:       Problem List Items Addressed This Visit       Proctitis, chlamydial     Has completed 3 weeks of Doxycycline-will inform CARLOS Powers         HIV (human immunodeficiency virus infection)     Will follow CARLOS Powers.on Biktarvy

## 2024-06-23 ENCOUNTER — HOSPITAL ENCOUNTER (EMERGENCY)
Facility: HOSPITAL | Age: 34
Discharge: HOME | End: 2024-06-24
Attending: EMERGENCY MEDICINE
Payer: COMMERCIAL

## 2024-06-23 DIAGNOSIS — K52.9 COLITIS: Primary | ICD-10-CM

## 2024-06-23 DIAGNOSIS — R10.9 ABDOMINAL PAIN: ICD-10-CM

## 2024-06-23 LAB
ABO + RH BLD: NORMAL
ALBUMIN SERPL BCP-MCNC: 4.5 G/DL (ref 3.5–5.2)
ALP SERPL-CCNC: 70 U/L (ref 55–135)
ALT SERPL W/O P-5'-P-CCNC: 22 U/L (ref 10–44)
ANION GAP SERPL CALC-SCNC: 10 MMOL/L (ref 8–16)
APTT PPP: 27.3 SEC (ref 21–32)
AST SERPL-CCNC: 21 U/L (ref 10–40)
BASOPHILS # BLD AUTO: 0.04 K/UL (ref 0–0.2)
BASOPHILS NFR BLD: 0.6 % (ref 0–1.9)
BILIRUB SERPL-MCNC: 0.4 MG/DL (ref 0.1–1)
BILIRUB UR QL STRIP: NEGATIVE
BLD GP AB SCN CELLS X3 SERPL QL: NORMAL
BUN SERPL-MCNC: 14 MG/DL (ref 6–20)
CALCIUM SERPL-MCNC: 9.2 MG/DL (ref 8.7–10.5)
CHLORIDE SERPL-SCNC: 106 MMOL/L (ref 95–110)
CLARITY UR: CLEAR
CO2 SERPL-SCNC: 24 MMOL/L (ref 23–29)
COLOR UR: COLORLESS
CREAT SERPL-MCNC: 1.1 MG/DL (ref 0.5–1.4)
DIFFERENTIAL METHOD BLD: ABNORMAL
EOSINOPHIL # BLD AUTO: 0 K/UL (ref 0–0.5)
EOSINOPHIL NFR BLD: 0.6 % (ref 0–8)
ERYTHROCYTE [DISTWIDTH] IN BLOOD BY AUTOMATED COUNT: 12.3 % (ref 11.5–14.5)
EST. GFR  (NO RACE VARIABLE): >60 ML/MIN/1.73 M^2
GLUCOSE SERPL-MCNC: 93 MG/DL (ref 70–110)
GLUCOSE UR QL STRIP: NEGATIVE
HCT VFR BLD AUTO: 37.3 % (ref 40–54)
HGB BLD-MCNC: 12.3 G/DL (ref 14–18)
HGB UR QL STRIP: NEGATIVE
IMM GRANULOCYTES # BLD AUTO: 0.01 K/UL (ref 0–0.04)
IMM GRANULOCYTES NFR BLD AUTO: 0.1 % (ref 0–0.5)
INR PPP: 1 (ref 0.8–1.2)
KETONES UR QL STRIP: NEGATIVE
LEUKOCYTE ESTERASE UR QL STRIP: NEGATIVE
LIPASE SERPL-CCNC: 24 U/L (ref 4–60)
LIPASE SERPL-CCNC: 27 U/L (ref 4–60)
LYMPHOCYTES # BLD AUTO: 2.5 K/UL (ref 1–4.8)
LYMPHOCYTES NFR BLD: 36.8 % (ref 18–48)
MCH RBC QN AUTO: 32.5 PG (ref 27–31)
MCHC RBC AUTO-ENTMCNC: 33 G/DL (ref 32–36)
MCV RBC AUTO: 99 FL (ref 82–98)
MONOCYTES # BLD AUTO: 0.7 K/UL (ref 0.3–1)
MONOCYTES NFR BLD: 9.4 % (ref 4–15)
NEUTROPHILS # BLD AUTO: 3.6 K/UL (ref 1.8–7.7)
NEUTROPHILS NFR BLD: 52.5 % (ref 38–73)
NITRITE UR QL STRIP: NEGATIVE
NRBC BLD-RTO: 0 /100 WBC
PH UR STRIP: 8 [PH] (ref 5–8)
PLATELET # BLD AUTO: 292 K/UL (ref 150–450)
PMV BLD AUTO: 9.8 FL (ref 9.2–12.9)
POTASSIUM SERPL-SCNC: 3.5 MMOL/L (ref 3.5–5.1)
PROT SERPL-MCNC: 7.8 G/DL (ref 6–8.4)
PROT UR QL STRIP: NEGATIVE
PROTHROMBIN TIME: 10.9 SEC (ref 9–12.5)
RBC # BLD AUTO: 3.78 M/UL (ref 4.6–6.2)
SODIUM SERPL-SCNC: 140 MMOL/L (ref 136–145)
SP GR UR STRIP: 1.01 (ref 1–1.03)
SPECIMEN OUTDATE: NORMAL
URN SPEC COLLECT METH UR: ABNORMAL
UROBILINOGEN UR STRIP-ACNC: NEGATIVE EU/DL
WBC # BLD AUTO: 6.9 K/UL (ref 3.9–12.7)

## 2024-06-23 PROCEDURE — 99285 EMERGENCY DEPT VISIT HI MDM: CPT | Mod: 25

## 2024-06-23 PROCEDURE — 86900 BLOOD TYPING SEROLOGIC ABO: CPT | Performed by: EMERGENCY MEDICINE

## 2024-06-23 PROCEDURE — 85730 THROMBOPLASTIN TIME PARTIAL: CPT | Performed by: EMERGENCY MEDICINE

## 2024-06-23 PROCEDURE — 25000003 PHARM REV CODE 250: Performed by: EMERGENCY MEDICINE

## 2024-06-23 PROCEDURE — 83690 ASSAY OF LIPASE: CPT | Performed by: EMERGENCY MEDICINE

## 2024-06-23 PROCEDURE — 86901 BLOOD TYPING SEROLOGIC RH(D): CPT | Performed by: EMERGENCY MEDICINE

## 2024-06-23 PROCEDURE — 83690 ASSAY OF LIPASE: CPT | Mod: 91 | Performed by: NURSE PRACTITIONER

## 2024-06-23 PROCEDURE — 96374 THER/PROPH/DIAG INJ IV PUSH: CPT

## 2024-06-23 PROCEDURE — 63600175 PHARM REV CODE 636 W HCPCS: Performed by: EMERGENCY MEDICINE

## 2024-06-23 PROCEDURE — 25500020 PHARM REV CODE 255: Performed by: EMERGENCY MEDICINE

## 2024-06-23 PROCEDURE — 96361 HYDRATE IV INFUSION ADD-ON: CPT

## 2024-06-23 PROCEDURE — 85025 COMPLETE CBC W/AUTO DIFF WBC: CPT | Performed by: NURSE PRACTITIONER

## 2024-06-23 PROCEDURE — 85610 PROTHROMBIN TIME: CPT | Performed by: EMERGENCY MEDICINE

## 2024-06-23 PROCEDURE — 80053 COMPREHEN METABOLIC PANEL: CPT | Performed by: NURSE PRACTITIONER

## 2024-06-23 PROCEDURE — C9113 INJ PANTOPRAZOLE SODIUM, VIA: HCPCS | Performed by: EMERGENCY MEDICINE

## 2024-06-23 PROCEDURE — 81003 URINALYSIS AUTO W/O SCOPE: CPT | Performed by: NURSE PRACTITIONER

## 2024-06-23 RX ORDER — PANTOPRAZOLE SODIUM 40 MG/10ML
40 INJECTION, POWDER, LYOPHILIZED, FOR SOLUTION INTRAVENOUS
Status: COMPLETED | OUTPATIENT
Start: 2024-06-23 | End: 2024-06-23

## 2024-06-23 RX ADMIN — IOHEXOL 100 ML: 350 INJECTION, SOLUTION INTRAVENOUS at 10:06

## 2024-06-23 RX ADMIN — PANTOPRAZOLE SODIUM 40 MG: 40 INJECTION, POWDER, FOR SOLUTION INTRAVENOUS at 10:06

## 2024-06-23 RX ADMIN — SODIUM CHLORIDE 1000 ML: 9 INJECTION, SOLUTION INTRAVENOUS at 10:06

## 2024-06-24 VITALS
HEART RATE: 52 BPM | SYSTOLIC BLOOD PRESSURE: 129 MMHG | RESPIRATION RATE: 19 BRPM | WEIGHT: 173.31 LBS | OXYGEN SATURATION: 97 % | BODY MASS INDEX: 27.97 KG/M2 | TEMPERATURE: 99 F | DIASTOLIC BLOOD PRESSURE: 80 MMHG

## 2024-06-24 PROCEDURE — 25000003 PHARM REV CODE 250: Performed by: EMERGENCY MEDICINE

## 2024-06-24 RX ORDER — ONDANSETRON 4 MG/1
4 TABLET, FILM COATED ORAL EVERY 6 HOURS PRN
Qty: 12 TABLET | Refills: 0 | Status: SHIPPED | OUTPATIENT
Start: 2024-06-24

## 2024-06-24 RX ORDER — HYDROCODONE BITARTRATE AND ACETAMINOPHEN 10; 325 MG/1; MG/1
1 TABLET ORAL EVERY 6 HOURS PRN
Qty: 12 TABLET | Refills: 0 | Status: SHIPPED | OUTPATIENT
Start: 2024-06-24

## 2024-06-24 RX ORDER — AMOXICILLIN AND CLAVULANATE POTASSIUM 875; 125 MG/1; MG/1
1 TABLET, FILM COATED ORAL
Status: COMPLETED | OUTPATIENT
Start: 2024-06-24 | End: 2024-06-24

## 2024-06-24 RX ORDER — AMOXICILLIN AND CLAVULANATE POTASSIUM 875; 125 MG/1; MG/1
1 TABLET, FILM COATED ORAL 2 TIMES DAILY
Qty: 14 TABLET | Refills: 0 | Status: SHIPPED | OUTPATIENT
Start: 2024-06-24

## 2024-06-24 RX ADMIN — AMOXICILLIN AND CLAVULANATE POTASSIUM 1 TABLET: 875; 125 TABLET, FILM COATED ORAL at 01:06

## 2024-06-24 NOTE — ED NOTES
Instructions were given to Ramon  regarding the importance of not leaving the waiting room with the IV access in place.     The patient was instructed that it is crucial to notify nursing staff prior to leaving for the removal of the IV      Outcome: Ramon verbalized understanding and commitment to adhere to the instructions. Ramon pledged not to leave the waiting area w/ IV.       Specimen container given along with clean catch instructions for urine sample.    Ramon verbalized understanding.

## 2024-06-24 NOTE — DISCHARGE INSTRUCTIONS
Augmentin for infection.  Zofran for nausea.  Norco for pain.  Follow up with primary care 1-2 days for re-evaluation.  Return as needed for any worsening symptoms, problems, questions or concerns

## 2024-06-24 NOTE — FIRST PROVIDER EVALUATION
Medical screening examination initiated.  I have conducted a focused provider triage encounter, findings are as follows:    Brief history of present illness:  Patient presents with generalized abdominal pain and rectal bleeding.    Vitals:    06/23/24 2112   BP: (!) 143/82   BP Location: Right arm   Patient Position: Sitting   Pulse: 65   Resp: 17   Temp: 99 °F (37.2 °C)   TempSrc: Oral   SpO2: 98%   Weight: 78.6 kg (173 lb 4.5 oz)       Pertinent physical exam:  No acute distress noted    Brief workup plan:  Lab    Preliminary workup initiated; this workup will be continued and followed by the physician or advanced practice provider that is assigned to the patient when roomed.

## 2024-06-24 NOTE — ED PROVIDER NOTES
"SCRIBE #1 NOTE: I, Paradise Almazan, am scribing for, and in the presence of, Ramiro Block Jr., MD. I have scribed the entire note.       History     Chief Complaint   Patient presents with    Rectal Bleeding     Abd pain, abd cramping, rectal pain and pressure and nausea x 2 days. Pt reports clots in toilet bowl and blood when wiping. Hx of HIV and gastric bleed 6 months ago      Review of patient's allergies indicates:  No Known Allergies      History of Present Illness     HPI    6/23/2024, 9:47 PM  History obtained from the patient      History of Present Illness: Ramon Marcial is a 34 y.o. male patient with a PMHx of HIV, gastric bleed, and proctitis who presents to the Emergency Department for evaluation of moderate, episodic, "bright red" hematochezia which onset approximately 2 days PTA. The patient additionally reports epigastric abdominal pain, nausea, and rectal pain. No mitigating or exacerbating factors reported. Patient denies any vomiting, diarrhea, and all other sxs at this time. No prior Tx reported. No further complaints or concerns at this time.       Arrival mode: Personal vehicle    PCP: Vanessa, Primary Doctor        Past Medical History:  History reviewed. No pertinent past medical history.    Past Surgical History:  No past surgical history on file.      Family History:  No family history on file.    Social History:  Social History     Tobacco Use    Smoking status: Some Days     Types: Cigarettes    Smokeless tobacco: Not on file   Substance and Sexual Activity    Alcohol use: Not on file    Drug use: Not on file    Sexual activity: Not on file        Review of Systems     Review of Systems   Constitutional:  Negative for fever.   HENT:  Negative for sore throat.    Respiratory:  Negative for shortness of breath.    Cardiovascular:  Negative for chest pain.   Gastrointestinal:  Positive for abdominal pain, blood in stool, nausea and rectal pain. Negative for diarrhea and vomiting. "   Genitourinary:  Negative for dysuria.   Musculoskeletal:  Negative for back pain.   Skin:  Negative for rash.   Neurological:  Negative for weakness.   Hematological:  Does not bruise/bleed easily.   All other systems reviewed and are negative.     Physical Exam     Initial Vitals [06/23/24 2112]   BP Pulse Resp Temp SpO2   (!) 143/82 65 17 99 °F (37.2 °C) 98 %      MAP       --          Physical Exam  Nursing Notes and Vital Signs Reviewed.  Constitutional: Patient is in no acute distress. Well-developed and well-nourished.  Head: Atraumatic. Normocephalic.  Eyes:  EOM intact.  No scleral icterus.  ENT: Mucous membranes are moist.  Nares clear   Neck:  Full ROM. No JVD.  Cardiovascular: Regular rate. Regular rhythm No murmurs, rubs, or gallops. Distal pulses are 2+ and symmetric  Pulmonary/Chest: No respiratory distress. Clear to auscultation bilaterally. No wheezing or rales.  Equal chest wall rise bilaterally  Abdominal: Soft and non-distended.  There is no tenderness.  No rebound, guarding, or rigidity. Good bowel sounds.  Rectal exam shows no hemorrhoids.  No blood at rectum  Genitourinary: No CVA tenderness.  No suprapubic tenderness  Musculoskeletal: Moves all extremities. No obvious deformities.  5 x 5 strength in all extremities   Skin: Warm and dry.  Neurological:  Alert, awake, and appropriate.  Normal speech.  No acute focal neurological deficits are appreciated.  Two through 12 intact bilaterally.  Psychiatric: Normal affect. Good eye contact. Appropriate in content.     ED Course   Procedures  ED Vital Signs:  Vitals:    06/23/24 2112 06/23/24 2141 06/23/24 2222 06/23/24 2306   BP: (!) 143/82 (!) 155/75 136/81 132/65   Pulse: 65  (!) 59 (!) 51   Resp: 17   19   Temp: 99 °F (37.2 °C)      TempSrc: Oral      SpO2: 98%   97%   Weight: 78.6 kg (173 lb 4.5 oz)       06/23/24 2317 06/23/24 2332 06/23/24 2345 06/23/24 2346   BP: 122/65 127/78  104/70   Pulse: (!) 57 (!) 54  (!) 53   Resp: (!) 22   19    Temp:       TempSrc:       SpO2: 97% 96% 95%    Weight:           Abnormal Lab Results:  Labs Reviewed   CBC W/ AUTO DIFFERENTIAL - Abnormal; Notable for the following components:       Result Value    RBC 3.78 (*)     Hemoglobin 12.3 (*)     Hematocrit 37.3 (*)     MCV 99 (*)     MCH 32.5 (*)     All other components within normal limits   URINALYSIS, REFLEX TO URINE CULTURE - Abnormal; Notable for the following components:    Color, UA Colorless (*)     All other components within normal limits    Narrative:     Specimen Source->Urine   COMPREHENSIVE METABOLIC PANEL   LIPASE   APTT   LIPASE   PROTIME-INR   TYPE & SCREEN        All Lab Results:  Results for orders placed or performed during the hospital encounter of 06/23/24   CBC auto differential   Result Value Ref Range    WBC 6.90 3.90 - 12.70 K/uL    RBC 3.78 (L) 4.60 - 6.20 M/uL    Hemoglobin 12.3 (L) 14.0 - 18.0 g/dL    Hematocrit 37.3 (L) 40.0 - 54.0 %    MCV 99 (H) 82 - 98 fL    MCH 32.5 (H) 27.0 - 31.0 pg    MCHC 33.0 32.0 - 36.0 g/dL    RDW 12.3 11.5 - 14.5 %    Platelets 292 150 - 450 K/uL    MPV 9.8 9.2 - 12.9 fL    Immature Granulocytes 0.1 0.0 - 0.5 %    Gran # (ANC) 3.6 1.8 - 7.7 K/uL    Immature Grans (Abs) 0.01 0.00 - 0.04 K/uL    Lymph # 2.5 1.0 - 4.8 K/uL    Mono # 0.7 0.3 - 1.0 K/uL    Eos # 0.0 0.0 - 0.5 K/uL    Baso # 0.04 0.00 - 0.20 K/uL    nRBC 0 0 /100 WBC    Gran % 52.5 38.0 - 73.0 %    Lymph % 36.8 18.0 - 48.0 %    Mono % 9.4 4.0 - 15.0 %    Eosinophil % 0.6 0.0 - 8.0 %    Basophil % 0.6 0.0 - 1.9 %    Differential Method Automated    Comprehensive metabolic panel   Result Value Ref Range    Sodium 140 136 - 145 mmol/L    Potassium 3.5 3.5 - 5.1 mmol/L    Chloride 106 95 - 110 mmol/L    CO2 24 23 - 29 mmol/L    Glucose 93 70 - 110 mg/dL    BUN 14 6 - 20 mg/dL    Creatinine 1.1 0.5 - 1.4 mg/dL    Calcium 9.2 8.7 - 10.5 mg/dL    Total Protein 7.8 6.0 - 8.4 g/dL    Albumin 4.5 3.5 - 5.2 g/dL    Total Bilirubin 0.4 0.1 - 1.0 mg/dL     Alkaline Phosphatase 70 55 - 135 U/L    AST 21 10 - 40 U/L    ALT 22 10 - 44 U/L    eGFR >60 >60 mL/min/1.73 m^2    Anion Gap 10 8 - 16 mmol/L   Lipase   Result Value Ref Range    Lipase 27 4 - 60 U/L   Urinalysis, Reflex to Urine Culture Urine, Clean Catch    Specimen: Urine, Clean Catch   Result Value Ref Range    Specimen UA Urine, Clean Catch     Color, UA Colorless (A) Yellow, Straw, Sa[place]ia    Appearance, UA Clear Clear    pH, UA 8.0 5.0 - 8.0    Specific Gravity, UA 1.015 1.005 - 1.030    Protein, UA Negative Negative    Glucose, UA Negative Negative    Ketones, UA Negative Negative    Bilirubin (UA) Negative Negative    Occult Blood UA Negative Negative    Nitrite, UA Negative Negative    Urobilinogen, UA Negative <2.0 EU/dL    Leukocytes, UA Negative Negative   APTT   Result Value Ref Range    aPTT 27.3 21.0 - 32.0 sec   Lipase   Result Value Ref Range    Lipase 24 4 - 60 U/L   Protime-INR   Result Value Ref Range    Prothrombin Time 10.9 9.0 - 12.5 sec    INR 1.0 0.8 - 1.2   Type & Screen   Result Value Ref Range    Group & Rh A POS     Indirect [place] NEG     Specimen Outdate 06/26/2024 23:59          Imaging Results:  Imaging Results              CTA Acute GI Bazine, Abdomen and Pelvis (In process)                      X-Ray Abdomen AP 1 View (KUB) (Final result)  Result time 06/23/24 21:50:06      Final result by Andrew Jolly MD (06/23/24 21:50:06)                   Impression:      No acute abnormality identified.  Correlation and further evaluation as warranted.      Electronically signed by: Andrew Jolly  Date:    06/23/2024  Time:    21:50               Narrative:    EXAMINATION:  XR ABDOMEN AP 1 VIEW    CLINICAL HISTORY:  Unspecified abdominal pain    TECHNIQUE:  AP View(s) of the abdomen was performed.    COMPARISON:  None    FINDINGS:  No dilated loops of bowel.  No calculi overlie the renal shadows.  No definite free air.  Osseous structures grossly intact.                                      Type of Interpretation: Outside Written Report.  Radiology Procedure Done: Abdomen/Pelvis CT with Contrast.  Interpretation:   Rectal wall thickening compatible with a nonspecific proctitis.   Radiologist: Jorje Walker MD  06/24/2024 00:08                   The Emergency Provider reviewed the vital signs and test results, which are outlined above.     ED Discussion     12:30 AM: Reassessed pt at this time. Discussed with pt all pertinent ED information and results. Discussed pt dx and plan of tx. Gave pt all f/u and return to the ED instructions. All questions and concerns were addressed at this time. Pt expresses understanding of information and instructions, and is comfortable with plan to discharge. Pt is stable for discharge.    I discussed with patient and/or family/caretaker that evaluation in the ED does not suggest any emergent or life threatening medical conditions requiring immediate intervention beyond what was provided in the ED, and I believe patient is safe for discharge.  Regardless, an unremarkable evaluation in the ED does not preclude the development or presence of a serious of life threatening condition. As such, patient was instructed to return immediately for any worsening or change in current symptoms.      ED Course as of 06/24/24 0040   Sun Jun 23, 2024   5136 Cardiac monitor interpretation  Independent interpretation  Indication: Gastrointestinal bleeding  Normal sinus rhythm.  Rate 52.  No STEMI [RT]      ED Course User Index  [RT] Ramiro Block Jr., MD     Medical Decision Making  Differential diagnosis: Colitis, diverticulitis, hemorrhoids, bright red blood per rectum, diverticulosis, GI bleeding    Patient was stable nontoxic.  Patient was complaining of some bright red blood per rectum some mild abdominal pain.  There is no nausea vomiting fever or chills.  There is no hematemesis.  Chart review reveals a history of chlamydial proctitis, and upper GI bleed.  I do not see any scope  reports.  Patient was physical examination is benign CT imaging shows proctitis.  His hemoglobin is 12 point 3.  Patient was stable safe for discharge.  Will treat with Augmentin along with Zofran and pain control.  Patient verbalized understanding agreement with the plan of care and seems reliable.  Stable safe for discharge.    Amount and/or Complexity of Data Reviewed  External Data Reviewed: labs and notes.     Details: Patient with history of upper GI bleed.  Has a history of chlamydial proctitis as well  Labs: ordered. Decision-making details documented in ED Course.     Details: Coags normal lipase is 24 UA is negative CMP is normal hemoglobin is 12.3 with a white count of 6.9  Radiology: ordered. Decision-making details documented in ED Course.     Details: CT imaging consistent with proctitis    Risk  OTC drugs.  Prescription drug management.  Decision regarding hospitalization.                ED Medication(s):  Medications   amoxicillin-clavulanate 875-125mg per tablet 1 tablet (has no administration in time range)   sodium chloride 0.9% bolus 1,000 mL 1,000 mL (0 mLs Intravenous Stopped 6/23/24 2351)   pantoprazole injection 40 mg (40 mg Intravenous Given 6/23/24 2214)   iohexoL (OMNIPAQUE 350) injection 100 mL (100 mLs Intravenous Given 6/23/24 2241)       New Prescriptions    AMOXICILLIN-CLAVULANATE 875-125MG (AUGMENTIN) 875-125 MG PER TABLET    Take 1 tablet by mouth 2 (two) times daily.    HYDROCODONE-ACETAMINOPHEN (NORCO)  MG PER TABLET    Take 1 tablet by mouth every 6 (six) hours as needed.    ONDANSETRON (ZOFRAN) 4 MG TABLET    Take 1 tablet (4 mg total) by mouth every 6 (six) hours as needed for Nausea.        Follow-up Information       Clinic, [facility].    Contact information:  [facility] [facility] [address] [phone]                                 Scribe Attestation:   Scribe #1: I performed the above scribed service and the documentation accurately describes the  services I performed. I attest to the accuracy of the note.     Attending:   Physician Attestation Statement for Scribe #1: I, Ramiro Block Jr., MD, personally performed the services described in this documentation, as scribed by Paradise Almazan, in my presence, and it is both accurate and complete.           Clinical Impression       ICD-10-CM ICD-9-CM   1. Colitis  K52.9 558.9   2. Abdominal pain  R10.9 789.00       Disposition:   Disposition: Discharged  Condition: Stable         Ramiro Block Jr., MD  06/24/24 0040

## 2024-07-15 ENCOUNTER — HOSPITAL ENCOUNTER (EMERGENCY)
Facility: HOSPITAL | Age: 34
Discharge: HOME | End: 2024-07-16
Attending: EMERGENCY MEDICINE

## 2024-07-15 DIAGNOSIS — A41.9 SEPSIS: ICD-10-CM

## 2024-07-15 DIAGNOSIS — M25.571 RIGHT ANKLE PAIN: ICD-10-CM

## 2024-07-15 DIAGNOSIS — L03.115 CELLULITIS OF RIGHT ANKLE: Primary | ICD-10-CM

## 2024-07-15 LAB
ALBUMIN SERPL BCP-MCNC: 4.6 G/DL (ref 3.5–5.2)
ALP SERPL-CCNC: 68 U/L (ref 55–135)
ALT SERPL W/O P-5'-P-CCNC: 23 U/L (ref 10–44)
ANION GAP SERPL CALC-SCNC: 11 MMOL/L (ref 8–16)
AST SERPL-CCNC: 25 U/L (ref 10–40)
BACTERIA #/AREA URNS HPF: ABNORMAL /HPF
BASOPHILS # BLD AUTO: 0.05 K/UL (ref 0–0.2)
BASOPHILS NFR BLD: 0.5 % (ref 0–1.9)
BILIRUB SERPL-MCNC: 0.5 MG/DL (ref 0.1–1)
BILIRUB UR QL STRIP: NEGATIVE
BUN SERPL-MCNC: 13 MG/DL (ref 6–20)
CALCIUM SERPL-MCNC: 9.9 MG/DL (ref 8.7–10.5)
CHLORIDE SERPL-SCNC: 103 MMOL/L (ref 95–110)
CLARITY UR: CLEAR
CO2 SERPL-SCNC: 25 MMOL/L (ref 23–29)
COLOR UR: YELLOW
CREAT SERPL-MCNC: 1.1 MG/DL (ref 0.5–1.4)
DIFFERENTIAL METHOD BLD: ABNORMAL
EOSINOPHIL # BLD AUTO: 0.1 K/UL (ref 0–0.5)
EOSINOPHIL NFR BLD: 1.5 % (ref 0–8)
ERYTHROCYTE [DISTWIDTH] IN BLOOD BY AUTOMATED COUNT: 11.9 % (ref 11.5–14.5)
EST. GFR  (NO RACE VARIABLE): >60 ML/MIN/1.73 M^2
GLUCOSE SERPL-MCNC: 97 MG/DL (ref 70–110)
GLUCOSE UR QL STRIP: NEGATIVE
HCT VFR BLD AUTO: 43.1 % (ref 40–54)
HCV AB SERPL QL IA: NEGATIVE
HEP C VIRUS HOLD SPECIMEN: NORMAL
HGB BLD-MCNC: 14.5 G/DL (ref 14–18)
HGB UR QL STRIP: ABNORMAL
HYALINE CASTS #/AREA URNS LPF: 0 /LPF
IMM GRANULOCYTES # BLD AUTO: 0.02 K/UL (ref 0–0.04)
IMM GRANULOCYTES NFR BLD AUTO: 0.2 % (ref 0–0.5)
KETONES UR QL STRIP: NEGATIVE
LACTATE SERPL-SCNC: 1 MMOL/L (ref 0.5–2.2)
LEUKOCYTE ESTERASE UR QL STRIP: NEGATIVE
LYMPHOCYTES # BLD AUTO: 2.7 K/UL (ref 1–4.8)
LYMPHOCYTES NFR BLD: 28.4 % (ref 18–48)
MCH RBC QN AUTO: 32.2 PG (ref 27–31)
MCHC RBC AUTO-ENTMCNC: 33.6 G/DL (ref 32–36)
MCV RBC AUTO: 96 FL (ref 82–98)
MICROSCOPIC COMMENT: ABNORMAL
MONOCYTES # BLD AUTO: 0.8 K/UL (ref 0.3–1)
MONOCYTES NFR BLD: 8.2 % (ref 4–15)
NEUTROPHILS # BLD AUTO: 5.8 K/UL (ref 1.8–7.7)
NEUTROPHILS NFR BLD: 61.2 % (ref 38–73)
NITRITE UR QL STRIP: NEGATIVE
NRBC BLD-RTO: 0 /100 WBC
PH UR STRIP: 6 [PH] (ref 5–8)
PLATELET # BLD AUTO: 301 K/UL (ref 150–450)
PMV BLD AUTO: 9.8 FL (ref 9.2–12.9)
POTASSIUM SERPL-SCNC: 3.2 MMOL/L (ref 3.5–5.1)
PROCALCITONIN SERPL IA-MCNC: 0.13 NG/ML
PROT SERPL-MCNC: 8.8 G/DL (ref 6–8.4)
PROT UR QL STRIP: ABNORMAL
RBC # BLD AUTO: 4.5 M/UL (ref 4.6–6.2)
RBC #/AREA URNS HPF: 6 /HPF (ref 0–4)
SODIUM SERPL-SCNC: 139 MMOL/L (ref 136–145)
SP GR UR STRIP: >1.03 (ref 1–1.03)
URN SPEC COLLECT METH UR: ABNORMAL
UROBILINOGEN UR STRIP-ACNC: ABNORMAL EU/DL
WBC # BLD AUTO: 9.4 K/UL (ref 3.9–12.7)
WBC #/AREA URNS HPF: 0 /HPF (ref 0–5)

## 2024-07-15 PROCEDURE — 93005 ELECTROCARDIOGRAM TRACING: CPT

## 2024-07-15 PROCEDURE — 25000003 PHARM REV CODE 250: Performed by: EMERGENCY MEDICINE

## 2024-07-15 PROCEDURE — 85025 COMPLETE CBC W/AUTO DIFF WBC: CPT

## 2024-07-15 PROCEDURE — 81000 URINALYSIS NONAUTO W/SCOPE: CPT

## 2024-07-15 PROCEDURE — 86803 HEPATITIS C AB TEST: CPT | Performed by: EMERGENCY MEDICINE

## 2024-07-15 PROCEDURE — 83605 ASSAY OF LACTIC ACID: CPT

## 2024-07-15 PROCEDURE — 93010 ELECTROCARDIOGRAM REPORT: CPT | Mod: ,,, | Performed by: INTERNAL MEDICINE

## 2024-07-15 PROCEDURE — 80053 COMPREHEN METABOLIC PANEL: CPT

## 2024-07-15 PROCEDURE — 96361 HYDRATE IV INFUSION ADD-ON: CPT

## 2024-07-15 PROCEDURE — 84145 PROCALCITONIN (PCT): CPT

## 2024-07-15 PROCEDURE — 96374 THER/PROPH/DIAG INJ IV PUSH: CPT

## 2024-07-15 PROCEDURE — 87040 BLOOD CULTURE FOR BACTERIA: CPT | Mod: 59

## 2024-07-15 PROCEDURE — 63600175 PHARM REV CODE 636 W HCPCS: Performed by: EMERGENCY MEDICINE

## 2024-07-15 PROCEDURE — 99285 EMERGENCY DEPT VISIT HI MDM: CPT | Mod: 25

## 2024-07-15 RX ORDER — MORPHINE SULFATE 4 MG/ML
4 INJECTION, SOLUTION INTRAMUSCULAR; INTRAVENOUS
Status: COMPLETED | OUTPATIENT
Start: 2024-07-15 | End: 2024-07-15

## 2024-07-15 RX ORDER — ACETAMINOPHEN 500 MG
1000 TABLET ORAL
Status: COMPLETED | OUTPATIENT
Start: 2024-07-15 | End: 2024-07-15

## 2024-07-15 RX ADMIN — SODIUM CHLORIDE 1000 ML: 9 INJECTION, SOLUTION INTRAVENOUS at 11:07

## 2024-07-15 RX ADMIN — ACETAMINOPHEN 1000 MG: 500 TABLET ORAL at 11:07

## 2024-07-15 RX ADMIN — MORPHINE SULFATE 4 MG: 4 INJECTION INTRAVENOUS at 11:07

## 2024-07-16 VITALS
HEART RATE: 68 BPM | BODY MASS INDEX: 26.55 KG/M2 | TEMPERATURE: 98 F | RESPIRATION RATE: 18 BRPM | DIASTOLIC BLOOD PRESSURE: 58 MMHG | WEIGHT: 164.44 LBS | OXYGEN SATURATION: 97 % | SYSTOLIC BLOOD PRESSURE: 123 MMHG

## 2024-07-16 LAB — LACTATE SERPL-SCNC: 0.6 MMOL/L (ref 0.5–2.2)

## 2024-07-16 PROCEDURE — 25500020 PHARM REV CODE 255: Performed by: EMERGENCY MEDICINE

## 2024-07-16 PROCEDURE — 83605 ASSAY OF LACTIC ACID: CPT

## 2024-07-16 RX ORDER — SULFAMETHOXAZOLE AND TRIMETHOPRIM 800; 160 MG/1; MG/1
1 TABLET ORAL 2 TIMES DAILY
Qty: 14 TABLET | Refills: 0 | Status: SHIPPED | OUTPATIENT
Start: 2024-07-16 | End: 2024-07-23

## 2024-07-16 RX ADMIN — IOHEXOL 100 ML: 350 INJECTION, SOLUTION INTRAVENOUS at 01:07

## 2024-07-16 NOTE — ED PROVIDER NOTES
SCRIBE #1 NOTE: I, Paradise Almazan, am scribing for, and in the presence of, Holden Heck Jr., MD. I have scribed the entire note.       History     Chief Complaint   Patient presents with    Ankle Pain     Pt had bilateral ankle fractures 3 years ago. Pt reports right ankle swelling and pain. Pt reports he has had two previous issues with infection on right ankle. Pt also reports pain in left knee     Review of patient's allergies indicates:  No Known Allergies      History of Present Illness     HPI    7/15/2024, 10:51 PM  History obtained from the patient with Mongolian interpretation provided by [facility] [email or web address]      History of Present Illness: Ramon Marcial is a 34 y.o. male patient who presents to the Emergency Department for evaluation of constant, moderate right ankle pain which onset 3 days PTA, worse with ROM. The patient reports a Hx of fractures to the right ankle s/p ORIF with episodes of osteomyelitis. He reports subjective fever which onset last night. He noticed redness over the right ankle this morning, spreading up to his calf. Patient denies any CP, SOB, N/V, numbness, and all other sxs at this time. Prior Tx includes Aleve and Acetaminophen with no relief. No further complaints or concerns at this time.       Arrival mode: Personal vehicle    PCP: No, Primary Doctor        Past Medical History:  Past Medical History:   Diagnosis Date    Human immunodeficiency virus (HIV) disease        Past Surgical History:  Past Surgical History:   Procedure Laterality Date    ANKLE FRACTURE SURGERY Bilateral     x2         Family History:  No family history on file.    Social History:  Social History     Tobacco Use    Smoking status: Some Days     Types: Cigarettes    Smokeless tobacco: Not on file   Substance and Sexual Activity    Alcohol use: Not Currently    Drug use: Not Currently    Sexual activity: Not on file        Review of Systems     Review of Systems   Constitutional:  Positive  for fever.   HENT:  Negative for sore throat.    Respiratory:  Negative for shortness of breath.    Cardiovascular:  Negative for chest pain.   Gastrointestinal:  Negative for nausea and vomiting.   Genitourinary:  Negative for dysuria.   Musculoskeletal:  Positive for arthralgias (left ankle). Negative for back pain.   Skin:  Positive for color change (redness over the left ankle). Negative for rash.   Neurological:  Negative for weakness and numbness.   Hematological:  Does not bruise/bleed easily.   All other systems reviewed and are negative.     Physical Exam     Initial Vitals [07/15/24 1918]   BP Pulse Resp Temp SpO2   134/84 97 18 100.1 °F (37.8 °C) 98 %      MAP       --          Physical Exam  Nursing Notes and Vital Signs Reviewed.  Constitutional: Patient is in no acute distress. Well-developed and well-nourished.  Head: Atraumatic. Normocephalic.  Eyes: PERRL. EOM intact. Conjunctivae are not pale. No scleral icterus.  ENT: Mucous membranes are moist. Oropharynx is clear and symmetric.    Neck: Supple. Full ROM. No lymphadenopathy.  Cardiovascular: Regular rate. Regular rhythm. No murmurs, rubs, or gallops. Distal pulses are 2+ and symmetric.  Pulmonary/Chest: No respiratory distress. Clear to auscultation bilaterally. No wheezing or rales.  Abdominal: Soft and non-distended.  There is no tenderness.  No rebound, guarding, or rigidity. Good bowel sounds.  Genitourinary: No CVA tenderness  Musculoskeletal: Scar noted over the lateral portion of the right ankle, well healed, no bleeding or discharge, no palpable fluctuance. Mild erythema noted just proximal to the lateral malleolus with surrounding edema and tenderness to palpation. Neurovascularly intact distally, tendons intact.   Skin: Warm and dry.  Neurological:  Alert, awake, and appropriate.  Normal speech.  No acute focal neurological deficits are appreciated.  Psychiatric: Normal affect. Good eye contact. Appropriate in content.     ED Course    Procedures  ED Vital Signs:  Vitals:    07/15/24 1918 07/15/24 2234 07/15/24 2245 07/15/24 2300   BP: 134/84 126/75 118/76 123/76   Pulse: 97 73 71 68   Resp: 18 20 (!) 23 (!) 22   Temp: 100.1 °F (37.8 °C)      TempSrc: Oral      SpO2: 98% 98% 97% 96%   Weight: 74.6 kg (164 lb 7.4 oz)       07/15/24 2303 07/15/24 2315 07/15/24 2330 07/15/24 2345   BP: 123/76 114/73 114/71 115/61   Pulse: 78 68 68 66   Resp: 17 20 19 (!) 22   Temp:       TempSrc:       SpO2: 97% 95% 96% 95%   Weight:        07/16/24 0000 07/16/24 0015 07/16/24 0030 07/16/24 0545   BP: 108/62 115/64 116/64 (!) 108/59   Pulse: 65 66 62 (!) 52   Resp: (!) 21 17 19    Temp:       TempSrc:       SpO2: 95% 95% 95% 95%   Weight:        07/16/24 0600 07/16/24 0700   BP: 109/64 (!) 123/58   Pulse: (!) 59 68   Resp:  18   Temp:  97.8 °F (36.6 °C)   TempSrc:  Oral   SpO2: 95% 97%   Weight:         Abnormal Lab Results:  Labs Reviewed   CBC W/ AUTO DIFFERENTIAL - Abnormal       Result Value    WBC 9.40      RBC 4.50 (*)     Hemoglobin 14.5      Hematocrit 43.1      MCV 96      MCH 32.2 (*)     MCHC 33.6      RDW 11.9      Platelets 301      MPV 9.8      Immature Granulocytes 0.2      Gran # (ANC) 5.8      Immature Grans (Abs) 0.02      Lymph # 2.7      Mono # 0.8      Eos # 0.1      Baso # 0.05      nRBC 0      Gran % 61.2      Lymph % 28.4      Mono % 8.2      Eosinophil % 1.5      Basophil % 0.5      Differential Method Automated      Narrative:     Release to patient->Immediate   COMPREHENSIVE METABOLIC PANEL - Abnormal    Sodium 139      Potassium 3.2 (*)     Chloride 103      CO2 25      Glucose 97      BUN 13      Creatinine 1.1      Calcium 9.9      Total Protein 8.8 (*)     Albumin 4.6      Total Bilirubin 0.5      Alkaline Phosphatase 68      AST 25      ALT 23      eGFR >60      Anion Gap 11      Narrative:     Release to patient->Immediate   URINALYSIS, REFLEX TO URINE CULTURE - Abnormal    Specimen UA Urine, Clean Catch      Color, UA Yellow       Appearance, UA Clear      pH, UA 6.0      Specific Gravity, UA >1.030 (*)     Protein, UA 1+ (*)     Glucose, UA Negative      Ketones, UA Negative      Bilirubin (UA) Negative      Occult Blood UA Trace (*)     Nitrite, UA Negative      Urobilinogen, UA 4.0-6.0 (*)     Leukocytes, UA Negative      Narrative:     Specimen Source->Urine   URINALYSIS MICROSCOPIC - Abnormal    RBC, UA 6 (*)     WBC, UA 0      Bacteria None      Hyaline Casts, UA 0      Microscopic Comment SEE COMMENT      Narrative:     Specimen Source->Urine   CULTURE, BLOOD    Blood Culture, Routine No growth after 5 days.      Narrative:     Aerobic and anaerobic   CULTURE, BLOOD    Blood Culture, Routine No growth after 5 days.      Narrative:     Aerobic and anaerobic   HEPATITIS C ANTIBODY    Hepatitis C Ab Negative      Narrative:     Release to patient->Immediate   HEP C VIRUS HOLD SPECIMEN    HEP C Virus Hold Specimen Hold for HCV sendout      Narrative:     Release to patient->Immediate   LACTIC ACID, PLASMA    Lactate (Lactic Acid) 1.0     PROCALCITONIN    Procalcitonin 0.13      Narrative:     Release to patient->Immediate   LACTIC ACID, PLASMA    Lactate (Lactic Acid) 0.6          All Lab Results:  Results for orders placed or performed during the hospital encounter of 07/15/24   Blood culture x two cultures. Draw prior to antibiotics.    Specimen: Peripheral, Antecubital, Right; Blood   Result Value Ref Range    Blood Culture, Routine No growth after 5 days.    Blood culture x two cultures. Draw prior to antibiotics.    Specimen: Peripheral, Forearm, Right; Blood   Result Value Ref Range    Blood Culture, Routine No growth after 5 days.    Hepatitis C Antibody   Result Value Ref Range    Hepatitis C Ab Negative Negative   HCV Virus Hold Specimen   Result Value Ref Range    HEP C Virus Hold Specimen Hold for HCV sendout    CBC auto differential   Result Value Ref Range    WBC 9.40 3.90 - 12.70 K/uL    RBC 4.50 (L) 4.60 - 6.20 M/uL     Hemoglobin 14.5 14.0 - 18.0 g/dL    Hematocrit 43.1 40.0 - 54.0 %    MCV 96 82 - 98 fL    MCH 32.2 (H) 27.0 - 31.0 pg    MCHC 33.6 32.0 - 36.0 g/dL    RDW 11.9 11.5 - 14.5 %    Platelets 301 150 - 450 K/uL    MPV 9.8 9.2 - 12.9 fL    Immature Granulocytes 0.2 0.0 - 0.5 %    Gran # (ANC) 5.8 1.8 - 7.7 K/uL    Immature Grans (Abs) 0.02 0.00 - 0.04 K/uL    Lymph # 2.7 1.0 - 4.8 K/uL    Mono # 0.8 0.3 - 1.0 K/uL    Eos # 0.1 0.0 - 0.5 K/uL    Baso # 0.05 0.00 - 0.20 K/uL    nRBC 0 0 /100 WBC    Gran % 61.2 38.0 - 73.0 %    Lymph % 28.4 18.0 - 48.0 %    Mono % 8.2 4.0 - 15.0 %    Eosinophil % 1.5 0.0 - 8.0 %    Basophil % 0.5 0.0 - 1.9 %    Differential Method Automated    Comprehensive metabolic panel   Result Value Ref Range    Sodium 139 136 - 145 mmol/L    Potassium 3.2 (L) 3.5 - 5.1 mmol/L    Chloride 103 95 - 110 mmol/L    CO2 25 23 - 29 mmol/L    Glucose 97 70 - 110 mg/dL    BUN 13 6 - 20 mg/dL    Creatinine 1.1 0.5 - 1.4 mg/dL    Calcium 9.9 8.7 - 10.5 mg/dL    Total Protein 8.8 (H) 6.0 - 8.4 g/dL    Albumin 4.6 3.5 - 5.2 g/dL    Total Bilirubin 0.5 0.1 - 1.0 mg/dL    Alkaline Phosphatase 68 55 - 135 U/L    AST 25 10 - 40 U/L    ALT 23 10 - 44 U/L    eGFR >60 >60 mL/min/1.73 m^2    Anion Gap 11 8 - 16 mmol/L   Lactic acid, plasma #1   Result Value Ref Range    Lactate (Lactic Acid) 1.0 0.5 - 2.2 mmol/L   Urinalysis, Reflex to Urine Culture Urine, Clean Catch    Specimen: Urine   Result Value Ref Range    Specimen UA Urine, Clean Catch     Color, UA Yellow Yellow, Straw, Saadia    Appearance, UA Clear Clear    pH, UA 6.0 5.0 - 8.0    Specific Gravity, UA >1.030 (A) 1.005 - 1.030    Protein, UA 1+ (A) Negative    Glucose, UA Negative Negative    Ketones, UA Negative Negative    Bilirubin (UA) Negative Negative    Occult Blood UA Trace (A) Negative    Nitrite, UA Negative Negative    Urobilinogen, UA 4.0-6.0 (A) <2.0 EU/dL    Leukocytes, UA Negative Negative   Procalcitonin   Result Value Ref Range    Procalcitonin  0.13 <0.25 ng/mL   Urinalysis Microscopic   Result Value Ref Range    RBC, UA 6 (H) 0 - 4 /hpf    WBC, UA 0 0 - 5 /hpf    Bacteria None None-Occ /hpf    Hyaline Casts, UA 0 0-1/lpf /lpf    Microscopic Comment SEE COMMENT    Lactic acid, plasma #2   Result Value Ref Range    Lactate (Lactic Acid) 0.6 0.5 - 2.2 mmol/L   EKG 12-lead   Result Value Ref Range    QRS Duration 88 ms    [facility] QTC Calculation 411 ms         Imaging Results:  Imaging Results              CT Ankle (Including Hindfoot) With Contrast Left (Final result)  Result time 07/16/24 07:59:59      Final result by Maciej Rosenberg MD (07/16/24 07:59:59)                   Impression:      Postsurgical changes to the left ankle without evidence of osteomyelitis or acute osseous finding.  Soft tissue swelling may represent cellulitis.  No drainable fluid collection in the soft tissues.      Electronically signed by: Maciej Rosenberg  Date:    07/16/2024  Time:    07:59               Narrative:    EXAMINATION:  CT ANKLE (INCLUDING HINDFOOT) WITH CONTRAST LEFT    CLINICAL HISTORY:  Osteomyelitis suspected, ankle, xray done;    COMPARISON:  Ankle radiographs July 15, 2024.    TECHNIQUE:  Axial CT images were obtained of the ANKLE (LEFT).  Iterative reconstruction technique was used. The CT exam was performed using one or more of the following dose reduction techniques- Automated exposure control, adjustment of the mA and/or kV according to patient size, and/or use of iterative reconstructed technique.    FINDINGS:  Bones/joints: Lucencies related to prior screws in the distal tibia, fibula, and calcaneus.  Slight nonspecific associated distal tibial sclerosis is present.  No acute fracture.  No dislocation.  No CT evidence for osteomyelitis.    Soft tissues: She mild soft tissue edema in the lower leg compatible with cellulitis or generalized edema.  No gas is present to suggest necrotizing infection.  No discrete abscess is demonstrated.                                        X-Ray Chest AP Portable (Final result)  Result time 07/15/24 19:42:02      Final result by Davi Michael MD (07/15/24 19:42:02)                   Impression:      No acute abnormality.      Electronically signed by: Davi Michael  Date:    07/15/2024  Time:    19:42               Narrative:    EXAMINATION:  XR CHEST AP PORTABLE    CLINICAL HISTORY:  Sepsis;    TECHNIQUE:  Single frontal view of the chest was performed.    COMPARISON:  None    FINDINGS:  The lungs are clear, with normal appearance of pulmonary vasculature and no pleural effusion or pneumothorax.    The cardiac silhouette is normal in size. The hilar and mediastinal contours are unremarkable.    Bones are intact.                                       X-Ray Ankle Complete Right (Final result)  Result time 07/15/24 19:41:41      Final result by Davi Michael MD (07/15/24 19:41:41)                   Impression:      As above      Electronically signed by: Davi Michael  Date:    07/15/2024  Time:    19:41               Narrative:    EXAMINATION:  XR ANKLE COMPLETE 3 VIEW RIGHT    CLINICAL HISTORY:  Pain in right ankle and joints of right foot    TECHNIQUE:  AP, lateral, and oblique images of the right ankle were performed.    COMPARISON:  None    FINDINGS:  No acute fracture or dislocation.  Postsurgical changes involving the tibial plateau and distal fibula.  Mild degenerative joint disease.  Mild soft tissue prominence.  Calcaneal postsurgical changes with suggestion of screw tract.                                       Radiology (Final result)  Result time 07/17/24 09:41:56      Final result by Unknown User (07/17/24 09:41:56)                                       Type of Interpretation: Outside Written Report.  Radiology Procedure Done: Left Ankle.  Interpretation:   No acute findings in the left ankle.   Radiologist: Jorje Walker MD  7/16/2024 02:29            The EKG was ordered, reviewed, and independently interpreted by the ED  provider.  Interpretation time: 20:56  Rate: 85 BPM  Rhythm: normal sinus rhythm  Interpretation: Nonspecific T wave abnormality. No STEMI.           The Emergency Provider reviewed the vital signs and test results, which are outlined above.     ED Discussion     5:54 AM: Reassessed pt at this time. Discussed with pt all pertinent ED information and results. Discussed pt dx and plan of tx. Gave pt all f/u and return to the ED instructions. All questions and concerns were addressed at this time. Pt expresses understanding of information and instructions, and is comfortable with plan to discharge. Pt is stable for discharge.    I discussed with patient and/or family/caretaker that evaluation in the ED does not suggest any emergent or life threatening medical conditions requiring immediate intervention beyond what was provided in the ED, and I believe patient is safe for discharge.  Regardless, an unremarkable evaluation in the ED does not preclude the development or presence of a serious of life threatening condition. As such, patient was instructed to return immediately for any worsening or change in current symptoms.    For  CELLULITIS, I advised patient to: keep area clean and dry; apply antibiotic ointment as prescribed; refrain from squeezing or irritating site; apply moist heat to help with pain and abscess drainage; and to take antibiotics as prescribed. Patient was instructed to follow up with primary care provider, urgent care facility, or the emergency room if symptoms worsen and they develop a fever greater than 102.5 °F, have pain unrelieved by OTC or prescription medications, and excessive swelling. Also instructed patient to follow up with provider in 24-48 hours for wound re-check        Medical Decision Making  Amount and/or Complexity of Data Reviewed  Labs: ordered. Decision-making details documented in ED Course.  Radiology: ordered and independent interpretation performed. Decision-making details  documented in ED Course.  ECG/medicine tests: ordered and independent interpretation performed. Decision-making details documented in ED Course.    Risk  OTC drugs.  Prescription drug management.  Parenteral controlled substances.  Risk Details: OTC drugs, prescription drugs and controlled substances considered.  Due to patient's symptoms improving and pain controlled pain medications ordered appropriately.  Ddx: infection, dvt, contusion, fracture                ED Medication(s):  Medications   morphine injection 4 mg (4 mg Intravenous Given 7/15/24 2303)   sodium chloride 0.9% bolus 1,000 mL 1,000 mL (0 mLs Intravenous Stopped 7/16/24 0003)   acetaminophen tablet 1,000 mg (1,000 mg Oral Given 7/15/24 2303)   iohexoL (OMNIPAQUE 350) injection 100 mL (100 mLs Intravenous Given 7/16/24 0135)       Discharge Medication List as of 7/16/2024  6:44 AM        START taking these medications    Details   sulfamethoxazole-trimethoprim 800-160mg (BACTRIM DS) 800-160 mg Tab Take 1 tablet by mouth 2 (two) times daily. for 7 days, Starting Tue 7/16/2024, Until Tue 7/23/2024, Print              Follow-up Information       [facility]. Schedule an appointment as soon as possible for a visit in 1 week.    Contact information:  [address] [facility] [address] [phone]               [facility] Internal Med [facility]. Schedule an appointment as soon as possible for a visit in 1 week.    Specialty: Internal Medicine  Contact information:  [address] [facility] [address] [phone]  Additional information:  Please park on the [address] side and use the Clinic entrance. Check in on the 2nd floor, to the right.             The [facility] Orthopedics [facility]. Schedule an appointment as soon as possible for a visit in 1 week.    Specialty: Orthopedics  Contact information:  [address] [facility] [address] [phone]  Additional information:  Please park on the  [address] side and use the Clinic entrance. Check in on the 1st floor, to the right across from the café.             [facility] - Emergency Dept..    Specialty: Emergency Medicine  Why: As needed, If symptoms worsen  Contact information:  [address]  [facility] [address] [phone]                               Scribe Attestation:   Scribe #1: I performed the above scribed service and the documentation accurately describes the services I performed. I attest to the accuracy of the note.     Attending:   Physician Attestation Statement for Scribe #1: I, Holden Heck Jr., MD, personally performed the services described in this documentation, as scribed by Paradise Almazan, in my presence, and it is both accurate and complete.           Clinical Impression       ICD-10-CM ICD-9-CM   1. Cellulitis of right ankle  L03.115 682.6   2. Sepsis  A41.9 038.9     995.91   3. Right ankle pain  M25.571 719.47       Disposition:   Disposition: Discharged  Condition: Stable         Holden Heck Jr., MD  07/22/24 0055

## 2024-07-16 NOTE — FIRST PROVIDER EVALUATION
Medical screening examination initiated.  I have conducted a focused provider triage encounter, findings are as follows:    Brief history of present illness:  34-year-old male with history of open reduction right ankle surgery for years ago presents with worsening pain, swelling, erythema noted to the right ankle.    Vitals:    07/15/24 1918   BP: 134/84   BP Location: Right arm   Pulse: 97   Resp: 18   Temp: 100.1 °F (37.8 °C)   TempSrc: Oral   SpO2: 98%   Weight: 74.6 kg (164 lb 7.4 oz)       Pertinent physical exam:  Right ankle swelling, pain, erythema, fever    Brief workup plan:  Workup    Preliminary workup initiated; this workup will be continued and followed by the physician or advanced practice provider that is assigned to the patient when roomed.

## 2024-07-18 ENCOUNTER — OFFICE VISIT (OUTPATIENT)
Dept: SPORTS MEDICINE | Facility: CLINIC | Age: 34
End: 2024-07-18
Payer: COMMERCIAL

## 2024-07-18 DIAGNOSIS — M19.171 POST-TRAUMATIC OSTEOARTHRITIS OF RIGHT ANKLE: ICD-10-CM

## 2024-07-18 DIAGNOSIS — Z87.39 HISTORY OF OSTEOMYELITIS: ICD-10-CM

## 2024-07-18 DIAGNOSIS — M25.571 ACUTE RIGHT ANKLE PAIN: ICD-10-CM

## 2024-07-18 DIAGNOSIS — L03.119 ANKLE CELLULITIS: Primary | ICD-10-CM

## 2024-07-18 PROCEDURE — 99204 OFFICE O/P NEW MOD 45 MIN: CPT | Mod: S$GLB,,, | Performed by: STUDENT IN AN ORGANIZED HEALTH CARE EDUCATION/TRAINING PROGRAM

## 2024-07-18 PROCEDURE — 99999 PR PBB SHADOW E&M-EST. PATIENT-LVL III: CPT | Mod: PBBFAC,,, | Performed by: STUDENT IN AN ORGANIZED HEALTH CARE EDUCATION/TRAINING PROGRAM

## 2024-07-18 NOTE — PATIENT INSTRUCTIONS
Assessment:  Ramon Marcial is a 34 y.o. male with a chief complaint of Pain of the Right Ankle    Encounter Diagnoses   Name Primary?    Ankle cellulitis Yes    Post-traumatic osteoarthritis of right ankle     Acute right ankle pain     History of osteomyelitis       Plan:  Imaging reviewed (XR and CT)  Posttraumatic degenerative changes about the right ankle.  No findings concerning for deep skin or soft tissue infection, gas gangrene, osteomyelitis, etc..    Labs from ED reviewed  WBC 9.4, H/H 14.5/43.1, platelets 301, lactic acid WNL, procalcitonin WNL  Cr 1.1, GFR > 60, LFTs WNL  History and clinical exam is consistent with right ankle pain and swelling due to cellulitis.  This has been improving since starting Bactrim.  Recommend to finish taking Bactrim 800-160, twice daily, to complete total of 7 day course.  Can use Tylenol and/or ibuprofen, as needed for pain and discomfort.    If ankle is sore, can also use ice vs heat over the affected area as well.    If you continue have these recurrent skin and soft tissue infections at the site of the previous injury, and given h/o osteomyelitis, may warrant MRI for further evaluation.    Follow-up:  As needed or sooner if there are any problems between now and then.    Thank you for choosing [facility] and Dr. Oh Munoz for your orthopedic & sports medicine care. It is our goal to provide you with exceptional care that will help keep you healthy, active, and get you back in the game.    Please do not hesitate to reach out to us via email, phone, or MyChart with any questions, concerns, or feedback.    If you are experiencing pain/discomfort ,or have questions after 5pm and would like to be connected to the [facility] on-call team, please call this number and specify which Sports Medicine provider is treating you: [phone]

## 2024-07-18 NOTE — PROGRESS NOTES
[place]  utilized for exam:  Tod [id number]    Patient ID: Ramon Marcial  YOB: 1990  MRN: [id number]    Chief Complaint: Pain of the Right Ankle    Referred By: self    Occupation: cook      History of Present Illness: Ramon Marcial is a 34 y.o. male who presents today with Pain of the Right Ankle    He complains of recent lateral ankle pain that began around 7/12/24 without injury.  He has history of bilateral ankle fractures with subsequent osteomyelitis in the left ankle that necessitated hardware removal years ago.  He reports that he began to have pain in the lateral ankle with walking or pushing off with his foot.  The pain is located around the distal 6 cm or so of his distal fibula.  He was seen at the ED on 7/15/24 where he was diagnosed with cellulitis and treated with Bactrim.  He has also been using ibuprofen, tylenol and ice.    Thinking back, he thinks each episode where he has been diagnosed with an infection his ankle has in in the days following eating seafood of some sort.  When he was initially diagnosed with osteomyelitis, he was told the bacteria that was isolated was found in water, of the ocean and stagnant lakes.  He denies any exposure to water, no open wounds over the affected area, has not been swimming recently.  He did eat shrimp couple of days before this most recent episode.  He takes Biktarvy for HIV, which he is compliant with.    Past Medical History:   Past Medical History:   Diagnosis Date    Human immunodeficiency virus (HIV) disease      Past Surgical History:   Procedure Laterality Date    ANKLE FRACTURE SURGERY Bilateral     x2     No family history on file.  Social History     Socioeconomic History    Marital status: Single   Tobacco Use    Smoking status: Some Days     Types: Cigarettes   Substance and Sexual Activity    Alcohol use: Not Currently    Drug use: Not Currently     Social Determinants of  Health     Transportation Needs: No Transportation Needs (9/19/2022)    [facility]    DONNE[place] - Transportation     Lack of Transportation (Medical): No     Lack of Transportation (Non-Medical): No     Medication List with Changes/Refills   Current Medications    AMOXICILLIN-CLAVULANATE 875-125MG (AUGMENTIN) 875-125 MG PER TABLET    Take 1 tablet by mouth 2 (two) times daily.    BIKTARVY -25 MG (25 KG OR GREATER)    Take 1 tablet by mouth.    CETIRIZINE (ZYRTEC) 10 MG TABLET    Take 1 tablet (10 mg total) by mouth once daily.    ESCITALOPRAM OXALATE (LEXAPRO) 10 MG TABLET    Take 10 mg by mouth.    HYDROCODONE-ACETAMINOPHEN (NORCO)  MG PER TABLET    Take 1 tablet by mouth every 6 (six) hours as needed.    ONDANSETRON (ZOFRAN) 4 MG TABLET    Take 1 tablet (4 mg total) by mouth every 6 (six) hours as needed for Nausea.    SULFAMETHOXAZOLE-TRIMETHOPRIM 800-160MG (BACTRIM DS) 800-160 MG TAB    Take 1 tablet by mouth 2 (two) times daily. for 7 days    TRAZODONE (DESYREL) 50 MG TABLET    Take 50 mg by mouth every evening.    VITAMIN D2 1,250 MCG (50,000 UNIT) CAPSULE    Take 50,000 Units by mouth every 7 days.     Review of patient's allergies indicates:  No Known Allergies    Physical Exam:   There is no height or weight on file to calculate BMI.    Physical Exam  Detailed MSK exam:     Right ankle:  Inspection: No swelling, erythema or ecchymosis.     Healed scar from prior surgery  Palpation tenderness: Mild diffuse tenderness throughout distal lateral lower leg above ankle joint line  Range of motion: Mild global ROM limitation of ankle without pain  Strength:  5/5 DF    5/5 PF    5/5 Inversion    5/5 Eversion  N/V Exam:  Tibial:    Normal sensory (plantar foot)  Normal motor (FHL)    Sup Peroneal:   Normal sensory (dorsal foot)  Normal motor (Peroneals)            Deep Peroneal:   Normal sensory (1st web space)  Normal  motor (EHL)    Sural:   Normal sensory (lateral foot)   Saphenous:   Normal sensory (medial lower leg)   Normal pedal pulses, warm and well perfused with capillary refill < 2 sec    Calf soft and compressible     Imaging:  XR Results:  Results for orders placed during the hospital encounter of 07/15/24    X-Ray Ankle Complete Right    Narrative  EXAMINATION:  XR ANKLE COMPLETE 3 VIEW RIGHT    CLINICAL HISTORY:  Pain in right ankle and joints of right foot    TECHNIQUE:  AP, lateral, and oblique images of the right ankle were performed.    COMPARISON:  None    FINDINGS:  No acute fracture or dislocation.  Postsurgical changes involving the tibial plateau and distal fibula.  Mild degenerative joint disease.  Mild soft tissue prominence.  Calcaneal postsurgical changes with suggestion of screw tract.    Impression  As above      Electronically signed by: Davi Michael  Date:    07/15/2024  Time:    19:41      CT Results:  Results for orders placed during the hospital encounter of 07/15/24    CT Ankle (Including Hindfoot) With Contrast Left    Narrative  EXAMINATION:  CT ANKLE (INCLUDING HINDFOOT) WITH CONTRAST LEFT    CLINICAL HISTORY:  Osteomyelitis suspected, ankle, xray done;    COMPARISON:  Ankle radiographs July 15, 2024.    TECHNIQUE:  Axial CT images were obtained of the ANKLE (LEFT).  Iterative reconstruction technique was used. The CT exam was performed using one or more of the following dose reduction techniques- Automated exposure control, adjustment of the mA and/or kV according to patient size, and/or use of iterative reconstructed technique.    FINDINGS:  Bones/joints: Lucencies related to prior screws in the distal tibia, fibula, and calcaneus.  Slight nonspecific associated distal tibial sclerosis is present.  No acute fracture.  No dislocation.  No CT evidence for osteomyelitis.    Soft tissues: She mild soft tissue edema in the lower leg compatible with cellulitis or generalized edema.  No gas is  present to suggest necrotizing infection.  No discrete abscess is demonstrated.    Impression  Postsurgical changes to the left ankle without evidence of osteomyelitis or acute osseous finding.  Soft tissue swelling may represent cellulitis.  No drainable fluid collection in the soft tissues.      Electronically signed by: Maciej Martinez  Date:    07/16/2024  Time:    07:59      Relevant imaging results were reviewed and interpreted by me and per my read:  Postsurgical and posttraumatic degenerative changes about the right ankle, including distal tibia, distal fibula, and calcaneus.  Soft tissue swelling.  No evidence of osteomyelitis or gas or fluid collection in the soft tissues.    This was discussed with the patient and / or family today.     Patient Instructions   Assessment:  Ramon Marcial is a 34 y.o. male with a chief complaint of Pain of the Right Ankle    Encounter Diagnoses   Name Primary?    Ankle cellulitis Yes    Post-traumatic osteoarthritis of right ankle     Acute right ankle pain     History of osteomyelitis       Plan:  Imaging reviewed (XR and CT)  Posttraumatic degenerative changes about the right ankle.  No findings concerning for deep skin or soft tissue infection, gas gangrene, osteomyelitis, etc..    Labs from ED reviewed  WBC 9.4, H/H 14.5/43.1, platelets 301, lactic acid WNL, procalcitonin WNL  Cr 1.1, GFR > 60, LFTs WNL  History and clinical exam is consistent with right ankle pain and swelling due to cellulitis.  This has been improving since starting Bactrim.  Recommend to finish taking Bactrim 800-160, twice daily, to complete total of 7 day course.  Can use Tylenol and/or ibuprofen, as needed for pain and discomfort.    If ankle is sore, can also use ice vs heat over the affected area as well.    If you continue have these recurrent skin and soft tissue infections at the site of the previous injury, and given h/o osteomyelitis, may warrant MRI for further  evaluation.    Follow-up:  As needed or sooner if there are any problems between now and then.    Thank you for choosing [facility] and Dr. Oh Munoz for your orthopedic & sports medicine care. It is our goal to provide you with exceptional care that will help keep you healthy, active, and get you back in the game.    Please do not hesitate to reach out to us via email, phone, or MyChart with any questions, concerns, or feedback.    If you are experiencing pain/discomfort ,or have questions after 5pm and would like to be connected to the [facility] on-call team, please call this number and specify which Sports Medicine provider is treating you: [phone]       A copy of today's visit note has been sent to the referring provider.           Oh Munoz MD  Primary Care Sports Medicine    Disclaimer: This note was prepared using a voice recognition system and is likely to have sound alike errors within the text.

## 2024-07-19 LAB
OHS QRS DURATION: 88 MS
OHS QTC CALCULATION: 411 MS

## 2024-07-21 LAB
BACTERIA BLD CULT: NORMAL
BACTERIA BLD CULT: NORMAL

## 2025-03-22 ENCOUNTER — HOSPITAL ENCOUNTER (EMERGENCY)
Facility: HOSPITAL | Age: 35
Discharge: HOME | End: 2025-03-22
Attending: EMERGENCY MEDICINE
Payer: COMMERCIAL

## 2025-03-22 VITALS
BODY MASS INDEX: 28.57 KG/M2 | HEART RATE: 73 BPM | WEIGHT: 177 LBS | DIASTOLIC BLOOD PRESSURE: 78 MMHG | RESPIRATION RATE: 20 BRPM | OXYGEN SATURATION: 97 % | SYSTOLIC BLOOD PRESSURE: 124 MMHG | TEMPERATURE: 99 F

## 2025-03-22 DIAGNOSIS — R05.9 COUGH: ICD-10-CM

## 2025-03-22 DIAGNOSIS — J06.9 VIRAL URI WITH COUGH: Primary | ICD-10-CM

## 2025-03-22 LAB
GROUP A STREP MOLECULAR (OHS): NEGATIVE
INFLUENZA A MOLECULAR (OHS): NEGATIVE
INFLUENZA B MOLECULAR (OHS): NEGATIVE
SARS-COV-2 RDRP RESP QL NAA+PROBE: NEGATIVE

## 2025-03-22 PROCEDURE — 99284 EMERGENCY DEPT VISIT MOD MDM: CPT | Mod: 25

## 2025-03-22 PROCEDURE — 87651 STREP A DNA AMP PROBE: CPT | Performed by: NURSE PRACTITIONER

## 2025-03-22 PROCEDURE — 87502 INFLUENZA DNA AMP PROBE: CPT | Performed by: NURSE PRACTITIONER

## 2025-03-22 PROCEDURE — U0002 COVID-19 LAB TEST NON-CDC: HCPCS | Performed by: NURSE PRACTITIONER

## 2025-03-22 RX ORDER — GENTAMICIN SULFATE 3 MG/ML
2 SOLUTION/ DROPS OPHTHALMIC 4 TIMES DAILY
Qty: 15 ML | Refills: 0 | Status: SHIPPED | OUTPATIENT
Start: 2025-03-22

## 2025-03-22 RX ORDER — FLUTICASONE PROPIONATE 50 MCG
2 SPRAY, SUSPENSION (ML) NASAL 2 TIMES DAILY PRN
Qty: 15 G | Refills: 0 | Status: SHIPPED | OUTPATIENT
Start: 2025-03-22 | End: 2025-03-29

## 2025-03-22 RX ORDER — PROMETHAZINE HYDROCHLORIDE AND DEXTROMETHORPHAN HYDROBROMIDE 6.25; 15 MG/5ML; MG/5ML
5 SYRUP ORAL EVERY 6 HOURS PRN
Qty: 118 ML | Refills: 0 | Status: SHIPPED | OUTPATIENT
Start: 2025-03-22 | End: 2025-04-01

## 2025-03-22 NOTE — ED PROVIDER NOTES
Encounter Date: 3/22/2025       History     Chief Complaint   Patient presents with    Cough     Cough, sore throat, chills, subjective fever x 3 days.redness to left eye, + burning no itching.     34-year-old male with past medical history of HIV presents to ER complaining of cough, congestion, ear pain, sore throat, and fever.  Reports symptom onset 3 days ago.  Also complaining of irritation to left eye and drainage.  Reports no relief with over-the-counter antitussive.  Denies shortness of breath, abdominal pain, vomiting, or visual changes.        Review of patient's allergies indicates:  No Known Allergies  Past Medical History:   Diagnosis Date    Human immunodeficiency virus (HIV) disease      Past Surgical History:   Procedure Laterality Date    ANKLE FRACTURE SURGERY Bilateral     x2     No family history on file.  Social History[1]  Review of Systems   Constitutional:  Negative for fever.   HENT:  Positive for congestion, ear pain, postnasal drip and sore throat. Negative for trouble swallowing and voice change.    Eyes:  Positive for discharge and redness. Negative for photophobia and visual disturbance.   Respiratory:  Positive for cough. Negative for shortness of breath.    Cardiovascular:  Negative for chest pain.   Gastrointestinal:  Negative for abdominal pain, nausea and vomiting.   Genitourinary:  Negative for dysuria.   Musculoskeletal:  Negative for back pain.   Skin:  Negative for rash.   Neurological:  Negative for dizziness, weakness and headaches.   Hematological:  Does not bruise/bleed easily.       Physical Exam     Initial Vitals [03/22/25 1059]   BP Pulse Resp Temp SpO2   124/78 73 20 98.7 °F (37.1 °C) 97 %      MAP       --         Physical Exam    Nursing note and vitals reviewed.  Constitutional: He appears well-developed and well-nourished. No distress.   HENT:   Head: Normocephalic.   Right Ear: External ear normal.   Left Ear: External ear normal.   Nose: Nose normal. Mouth/Throat:  Oropharynx is clear and moist. No oropharyngeal exudate.   Eyes: EOM and lids are normal. Pupils are equal, round, and reactive to light. Lids are everted and swept, no foreign bodies found. Left eye exhibits no discharge and no exudate. No foreign body present in the left eye. Right conjunctiva is not injected. Left conjunctiva is injected.   Neck: Neck supple.   Cardiovascular:  Normal rate and regular rhythm.           Pulmonary/Chest: Breath sounds normal.   Abdominal: Abdomen is soft. He exhibits no distension.   Musculoskeletal:         General: Normal range of motion.      Cervical back: Neck supple.     Neurological: He is alert and oriented to person, place, and time.   Skin: Skin is warm and dry.   Psychiatric: He has a normal mood and affect.         ED Course   Procedures  Labs Reviewed   INFLUENZA A & B BY MOLECULAR - Normal       Result Value    INFLUENZA A MOLECULAR Negative      INFLUENZA B MOLECULAR  Negative     GROUP A STREP, MOLECULAR - Normal    Group A Strep Molecular Negative      Narrative:     Arcanobacterium haemolyticum and Beta Streptococcus group C and G will not be detected by this test method.  Please order Throat Culture ([id number]) if suspected.       SARS-COV-2 RNA AMPLIFICATION, QUAL - Normal    SARS COV-2 Molecular Negative     HEPATITIS C ANTIBODY   HEP C VIRUS HOLD SPECIMEN          Imaging Results              X-Ray Chest AP Portable (Final result)  Result time 03/22/25 15:35:02   Procedure changed from X-Ray Chest PA And Lateral     Final result by Brooklyn Lopez MD (03/22/25 15:35:02)                   Narrative:    EXAM: XR CHEST AP PORTABLE    CLINICAL HISTORY: Shortness of breath cough    PRIOR:  July 2024    FINDINGS:   The lungs are well aerated. No shift of the mediastinum.  No sizable pleural effusion or pneumothorax.    IMPRESSION:  No active or adverse finding    Finalized on: 3/22/2025 3:35 PM By:  Brooklyn Lopez MD  [facility]# [id number]      2025-03-22 15:37:12.999      [facility]                                     Medications - No data to display  Medical Decision Making  Amount and/or Complexity of Data Reviewed  Radiology: ordered.    Risk  Prescription drug management.                   3:14 PM  Patient presents to ER for evaluation of URI symptoms.  Patient comfortable.  Afebrile.  Nontoxic appearing.  Vital signs stable.  COVID, flu, and strep testing are negative.  Chest x-ray is without acute process.  All results discussed with patient.  We will discharge home with antitussive, Flonase, and ophthalmic antibiotic drops.  Advised to follow up with primary care provider for re-evaluation.  Advised to return to ER for new or worsening symptoms.  Patient verbalized understanding and is in agreement with treatment plan.  Stable for discharge.  Differential diagnosis include COVID, flu, strep, pneumonia, bacterial conjunctivitis, corneal abrasion, corneal foreign body.                   Clinical Impression:  Final diagnoses:  [R05.9] Cough  [J06.9] Viral URI with cough (Primary)          ED Disposition Condition    Discharge Stable          ED Prescriptions       Medication Sig Dispense Start Date End Date Auth. Provider    promethazine-dextromethorphan (PROMETHAZINE-DM) 6.25-15 mg/5 mL Syrp Take 5 mLs by mouth every 6 (six) hours as needed (cough). 118 mL 3/22/2025 4/1/2025 Kristy Carter NP    fluticasone propionate (FLONASE) 50 mcg/actuation nasal spray 2 sprays (100 mcg total) by Each Nostril route 2 (two) times daily as needed for Rhinitis. 15 g 3/22/2025 3/29/2025 Kristy Carter NP    gentamicin (GARAMYCIN) 0.3 % ophthalmic solution Place 2 drops into both eyes 4 (four) times daily. 15 mL 3/22/2025 -- Kristy Carter NP          Follow-up Information       Follow up With Specialties Details Why Contact Info    PCP  Schedule an appointment as soon as possible for a visit                    [1]   Social History  Tobacco Use    Smoking status: Some Days     Types:  Cigarettes    Smokeless tobacco: Never   Substance Use Topics    Alcohol use: Not Currently    Drug use: Not Currently        Kristy Carter NP  03/22/25 [id number]

## 2025-03-22 NOTE — FIRST PROVIDER EVALUATION
Medical screening examination initiated.  I have conducted a focused provider triage encounter, findings are as follows:    Brief history of present illness:  Cough, sore throat, fever, eye redness    Vitals:    03/22/25 1059   BP: 124/78   BP Location: Right arm   Pulse: 73   Resp: 20   Temp: 98.7 °F (37.1 °C)   TempSrc: Oral   SpO2: 97%   Weight: 80.3 kg (177 lb)       Pertinent physical exam:  Vital signs stable    Brief workup plan:  COVID, flu and strep testing.  Chest x-ray.  Further evaluation    Preliminary workup initiated; this workup will be continued and followed by the physician or advanced practice provider that is assigned to the patient when roomed.

## 2025-03-22 NOTE — DISCHARGE INSTRUCTIONS
Take medication as prescribed.  Follow up with your primary care provider for re-evaluation.  Return to ER for new or worsening symptoms.